# Patient Record
Sex: FEMALE | Race: WHITE | Employment: UNEMPLOYED | URBAN - METROPOLITAN AREA
[De-identification: names, ages, dates, MRNs, and addresses within clinical notes are randomized per-mention and may not be internally consistent; named-entity substitution may affect disease eponyms.]

---

## 2017-01-30 ENCOUNTER — OFFICE VISIT (OUTPATIENT)
Dept: FAMILY MEDICINE CLINIC | Age: 62
End: 2017-01-30

## 2017-01-30 VITALS
SYSTOLIC BLOOD PRESSURE: 118 MMHG | DIASTOLIC BLOOD PRESSURE: 84 MMHG | HEIGHT: 66 IN | BODY MASS INDEX: 41.69 KG/M2 | RESPIRATION RATE: 16 BRPM | TEMPERATURE: 96.3 F | HEART RATE: 80 BPM | WEIGHT: 259.4 LBS

## 2017-01-30 DIAGNOSIS — F41.9 ANXIETY: Primary | ICD-10-CM

## 2017-01-30 DIAGNOSIS — I10 ESSENTIAL HYPERTENSION: ICD-10-CM

## 2017-01-30 DIAGNOSIS — F32.A DEPRESSION, UNSPECIFIED DEPRESSION TYPE: ICD-10-CM

## 2017-01-30 DIAGNOSIS — Z12.31 VISIT FOR SCREENING MAMMOGRAM: ICD-10-CM

## 2017-01-30 DIAGNOSIS — M19.90 OSTEOARTHRITIS, UNSPECIFIED OSTEOARTHRITIS TYPE, UNSPECIFIED SITE: ICD-10-CM

## 2017-01-30 LAB
ALBUMIN SERPL-MCNC: NORMAL G/DL
ALP BLD-CCNC: NORMAL U/L
ALT SERPL-CCNC: 25 U/L
AST SERPL-CCNC: 19 U/L
BILIRUB SERPL-MCNC: NORMAL MG/DL (ref 0.1–1.4)
BUN BLDV-MCNC: 18 MG/DL
CALCIUM SERPL-MCNC: NORMAL MG/DL
CHLORIDE BLD-SCNC: 97 MMOL/L
CO2: NORMAL MMOL/L
CREAT SERPL-MCNC: 1.01 MG/DL
GFR CALCULATED: NORMAL
GLUCOSE BLD-MCNC: 99 MG/DL
POTASSIUM SERPL-SCNC: 5 MMOL/L
SODIUM BLD-SCNC: 139 MMOL/L
TOTAL PROTEIN: NORMAL

## 2017-01-30 PROCEDURE — 99212 OFFICE O/P EST SF 10 MIN: CPT | Performed by: FAMILY MEDICINE

## 2017-01-30 RX ORDER — BENAZEPRIL HYDROCHLORIDE 20 MG/1
TABLET ORAL
Qty: 90 TABLET | Refills: 3 | Status: SHIPPED | OUTPATIENT
Start: 2017-01-30 | End: 2017-10-04 | Stop reason: SDUPTHER

## 2017-01-30 RX ORDER — ESCITALOPRAM OXALATE 20 MG/1
20 TABLET ORAL DAILY
Qty: 30 TABLET | Refills: 3 | Status: SHIPPED | OUTPATIENT
Start: 2017-01-30 | End: 2017-06-15 | Stop reason: SDUPTHER

## 2017-01-30 RX ORDER — DICLOFENAC SODIUM 75 MG/1
TABLET, DELAYED RELEASE ORAL
Qty: 180 TABLET | Refills: 3 | Status: SHIPPED | OUTPATIENT
Start: 2017-01-30 | End: 2017-10-04 | Stop reason: SDUPTHER

## 2017-01-30 RX ORDER — ALPRAZOLAM 0.5 MG/1
0.5 TABLET ORAL 2 TIMES DAILY PRN
Qty: 60 TABLET | Refills: 4 | Status: SHIPPED | OUTPATIENT
Start: 2017-01-30 | End: 2017-10-04 | Stop reason: SDUPTHER

## 2017-01-30 RX ORDER — CITALOPRAM 20 MG/1
TABLET ORAL
Qty: 180 TABLET | Refills: 3 | Status: CANCELLED | OUTPATIENT
Start: 2017-01-30

## 2017-01-30 RX ORDER — TRAMADOL HYDROCHLORIDE 50 MG/1
50 TABLET ORAL EVERY 8 HOURS PRN
Qty: 90 TABLET | Refills: 2 | Status: SHIPPED | OUTPATIENT
Start: 2017-01-30 | End: 2017-10-04 | Stop reason: SDUPTHER

## 2017-01-30 ASSESSMENT — ENCOUNTER SYMPTOMS
VOMITING: 0
DIARRHEA: 0
NAUSEA: 0
ABDOMINAL PAIN: 0
SHORTNESS OF BREATH: 0
COUGH: 0
CONSTIPATION: 0
EYES NEGATIVE: 1

## 2017-02-01 DIAGNOSIS — M19.90 OSTEOARTHRITIS, UNSPECIFIED OSTEOARTHRITIS TYPE, UNSPECIFIED SITE: ICD-10-CM

## 2017-02-01 DIAGNOSIS — I10 ESSENTIAL HYPERTENSION: ICD-10-CM

## 2017-06-15 RX ORDER — ESCITALOPRAM OXALATE 20 MG/1
TABLET ORAL
Qty: 30 TABLET | Refills: 2 | Status: SHIPPED | OUTPATIENT
Start: 2017-06-15 | End: 2017-10-04 | Stop reason: SDUPTHER

## 2017-08-17 DIAGNOSIS — M19.90 OSTEOARTHRITIS, UNSPECIFIED OSTEOARTHRITIS TYPE, UNSPECIFIED SITE: ICD-10-CM

## 2017-08-18 RX ORDER — ALPRAZOLAM 0.5 MG/1
TABLET ORAL
Qty: 60 TABLET | Refills: 3 | OUTPATIENT
Start: 2017-08-18

## 2017-10-04 ENCOUNTER — OFFICE VISIT (OUTPATIENT)
Dept: FAMILY MEDICINE CLINIC | Age: 62
End: 2017-10-04

## 2017-10-04 VITALS
BODY MASS INDEX: 37.79 KG/M2 | WEIGHT: 240.8 LBS | SYSTOLIC BLOOD PRESSURE: 130 MMHG | HEART RATE: 58 BPM | HEIGHT: 67 IN | DIASTOLIC BLOOD PRESSURE: 80 MMHG | RESPIRATION RATE: 14 BRPM

## 2017-10-04 DIAGNOSIS — I10 ESSENTIAL HYPERTENSION: ICD-10-CM

## 2017-10-04 DIAGNOSIS — F32.A DEPRESSION, UNSPECIFIED DEPRESSION TYPE: ICD-10-CM

## 2017-10-04 DIAGNOSIS — M19.90 OSTEOARTHRITIS, UNSPECIFIED OSTEOARTHRITIS TYPE, UNSPECIFIED SITE: ICD-10-CM

## 2017-10-04 DIAGNOSIS — F41.9 ANXIETY: Primary | ICD-10-CM

## 2017-10-04 PROCEDURE — 99212 OFFICE O/P EST SF 10 MIN: CPT | Performed by: FAMILY MEDICINE

## 2017-10-04 RX ORDER — TRAMADOL HYDROCHLORIDE 50 MG/1
50 TABLET ORAL EVERY 8 HOURS PRN
Qty: 90 TABLET | Refills: 2 | Status: SHIPPED | OUTPATIENT
Start: 2017-10-04 | End: 2018-02-27 | Stop reason: SDUPTHER

## 2017-10-04 RX ORDER — ESCITALOPRAM OXALATE 20 MG/1
TABLET ORAL
Qty: 90 TABLET | Refills: 2 | Status: SHIPPED | OUTPATIENT
Start: 2017-10-04 | End: 2018-02-27 | Stop reason: SDUPTHER

## 2017-10-04 RX ORDER — CITALOPRAM 20 MG/1
TABLET ORAL
Qty: 180 TABLET | Refills: 2 | Status: CANCELLED | OUTPATIENT
Start: 2017-10-04

## 2017-10-04 RX ORDER — DICLOFENAC SODIUM 75 MG/1
TABLET, DELAYED RELEASE ORAL
Qty: 180 TABLET | Refills: 2 | Status: SHIPPED | OUTPATIENT
Start: 2017-10-04 | End: 2018-02-27 | Stop reason: SDUPTHER

## 2017-10-04 RX ORDER — BENAZEPRIL HYDROCHLORIDE 20 MG/1
TABLET ORAL
Qty: 90 TABLET | Refills: 2 | Status: SHIPPED | OUTPATIENT
Start: 2017-10-04 | End: 2018-02-27 | Stop reason: SDUPTHER

## 2017-10-04 RX ORDER — ALPRAZOLAM 0.5 MG/1
0.5 TABLET ORAL 2 TIMES DAILY PRN
Qty: 60 TABLET | Refills: 2 | Status: SHIPPED | OUTPATIENT
Start: 2017-10-04 | End: 2018-02-27 | Stop reason: SDUPTHER

## 2017-10-04 ASSESSMENT — ENCOUNTER SYMPTOMS
EYES NEGATIVE: 1
CONSTIPATION: 0
DIARRHEA: 0
ABDOMINAL PAIN: 0
COUGH: 0
NAUSEA: 0
SHORTNESS OF BREATH: 0

## 2017-10-04 ASSESSMENT — PATIENT HEALTH QUESTIONNAIRE - PHQ9
SUM OF ALL RESPONSES TO PHQ QUESTIONS 1-9: 0
SUM OF ALL RESPONSES TO PHQ9 QUESTIONS 1 & 2: 0
2. FEELING DOWN, DEPRESSED OR HOPELESS: 0
1. LITTLE INTEREST OR PLEASURE IN DOING THINGS: 0

## 2017-10-04 NOTE — MR AVS SNAPSHOT
After Visit Summary             Refugio Rodarte   10/4/2017 8:15 AM   Office Visit    Description:  Female : 1955   Provider:  Iraj Malin MD   Department:  [facility]: SMITH Alexander PCP              Your Follow-Up and Future Appointments         Below is a list of your follow-up and future appointments. This may not be a complete list as you may have made appointments directly with providers that we are not aware of or your providers may have made some for you. Please call your providers to confirm appointments. It is important to keep your appointments. Please bring your current insurance card, photo ID, co-pay, and all medication bottles to your appointment. If self-pay, payment is expected at the time of service. Information from Your Visit        Department     Name Address Phone Fax    [facility]: SMITH Alexander PCP [address] [place] [address] [phone]      You Were Seen for:         Comments    Anxiety   [[id number]]         Vital Signs     Blood Pressure Pulse Respirations Height Weight Last Menstrual Period    130/80 58 14 5' 7\" (1.702 m) 240 lb 12.8 oz (109.2 kg) 2007    Body Mass Index Smoking Status                37.71 kg/m2 Never Smoker          Additional Information about your Body Mass Index (BMI)           Your BMI as listed above is considered obese (30 or more). BMI is an estimate of body fat, calculated from your height and weight. The higher your BMI, the greater your risk of heart disease, high blood pressure, type 2 diabetes, stroke, gallstones, arthritis, sleep apnea, and certain cancers. BMI is not perfect. It may overestimate body fat in athletes and people who are more muscular. Even a small weight loss (between 5 and 10 percent of your current weight) by decreasing your calorie intake and becoming more physically active will help lower your risk of developing or worsening diseases associated with obesity. Learn more at: Your Style Unzipped.co.uk             Today's Medication Changes          These changes are accurate as of: 10/4/17  8:57 AM.  If you have any questions, ask your nurse or doctor. CHANGE how you take these medications           escitalopram 20 MG tablet   Commonly known as:  LEXAPRO   Instructions:  TAKE ONE TABLET BY MOUTH DAILY   Quantity:  90 tablet   Refills:  2   What changed:  See the new instructions. Changed by:  Daya Garcia MD         STOP taking these medications           citalopram 20 MG tablet   Commonly known as:  CELEXA   Stopped by:  Daya Garcia MD            Where to Get Your Medications      These medications were sent to [address] [place] [address] [phone] [facility] [phone]  [address] [place] [address], [address] [place] [address]     Phone:  [phone]     benazepril 20 MG tablet    diclofenac 75 MG EC tablet    escitalopram 20 MG tablet         You can get these medications from any pharmacy     Bring a paper prescription for each of these medications     ALPRAZolam 0.5 MG tablet    traMADol 50 MG tablet               Your Current Medications Are              escitalopram (LEXAPRO) 20 MG tablet TAKE ONE TABLET BY MOUTH DAILY    diclofenac (VOLTAREN) 75 MG EC tablet TAKE ONE TABLET BY MOUTH TWICE A DAY WITH MEALS    benazepril (LOTENSIN) 20 MG tablet TAKE ONE TABLET BY MOUTH EVERY DAY    ALPRAZolam (XANAX) 0.5 MG tablet Take 1 tablet by mouth 2 times daily as needed for Sleep or Anxiety    traMADol (ULTRAM) 50 MG tablet Take 1 tablet by mouth every 8 hours as needed for Pain    Omega-3 Fatty Acids (OMEGA 3 PO) Take by mouth    Ferrous Sulfate (IRON CR PO) Take  by mouth.         Allergies              Buspar [Buspirone Hcl] Nausea Only    Pcn [Penicillins]     Jittery         Additional Information        Basic Information     Date Of Birth Sex Race Ethnicity Preferred Language 1955 Female White Unavailable/Unknown English      Problem List as of 10/4/2017  Date Reviewed: 10/4/2017                Essential hypertension    Anxiety    Depression    Hypercholesteremia      Preventive Care        Date Due    Hepatitis C screening is recommended for all adults regardless of risk factors born between [birth year] and 1965 at least once (lifetime) who have never been tested. 1955    HIV screening is recommended for all people regardless of risk factors  aged 15-65 years at least once (lifetime) who have never been HIV tested. 3/18/1971    Colonoscopy 1/1/2016    Zoster Vaccine 3/18/2016    Tetanus Combination Vaccine (1 - Tdap) 1/30/2018 (Originally 3/18/1975)    Mammograms are recommended every 2 years for low/average risk patients aged 48 - 69, and every year for high risk patients per updated national guidelines. However these guidelines can be individualized by your provider. 1/30/2018 (Originally 3/18/2006)    Pap Smear 1/30/2018 (Originally 3/18/1977)    Yearly Flu Vaccine (1) 1/30/2018 (Originally 9/1/2017)    Cholesterol Screening 10/30/2019            [employer] [address] allows you to send messages to your doctor, view your test results, renew your prescriptions, schedule appointments, view visit notes, and more. How Do I Sign Up? 1. In your Internet browser, go to [email or web address]  2. Click on the Sign Up Now link in the Sign In box. You will see the New Member Sign Up page. 3. Enter your [employer] Access Code exactly as it appears below. You will not need to use this code after youve completed the sign-up process. If you do not sign up before the expiration date, you must request a new code. [employer] Access Code: [email or web address]  Expires: 12/3/2017  8:57 AM    4. Enter your Social Security Number ([id number]) and Date of Birth (mm/dd/yyyy) as indicated and click Submit. You will be taken to the next sign-up page. 5. Create a [employer] ID. This will be your [employer] login ID and cannot be changed, so think of one that is secure and easy to remember. 6. Create a [employer] password. You can change your password at any time. 7. Enter your Password Reset Question and Answer. This can be used at a later time if you forget your password. 8. Enter your e-mail address. You will receive e-mail notification when new information is available in [address]. 9. Click Sign Up. You can now view your medical record. Additional Information  If you have questions, please contact the physician practice where you receive care. Remember, [employer] is NOT to be used for urgent needs. For medical emergencies, dial 911. For questions regarding your [employer] account call [phone]. If you have a clinical question, please call your doctor's office.

## 2017-10-04 NOTE — PROGRESS NOTES
Relation Age of Onset    Cancer Mother      Ovarian Cancer    Diabetes Father     High Blood Pressure Father     Heart Disease Father     Mental Illness Sister      Bipolar     Allergies   Allergen Reactions    Buspar [Buspirone Hcl] Nausea Only    Pcn [Penicillins]      Jittery     Current Outpatient Prescriptions   Medication Sig Dispense Refill    escitalopram (LEXAPRO) 20 MG tablet TAKE ONE TABLET BY MOUTH DAILY 90 tablet 2    diclofenac (VOLTAREN) 75 MG EC tablet TAKE ONE TABLET BY MOUTH TWICE A DAY WITH MEALS 180 tablet 2    benazepril (LOTENSIN) 20 MG tablet TAKE ONE TABLET BY MOUTH EVERY DAY 90 tablet 2    ALPRAZolam (XANAX) 0.5 MG tablet Take 1 tablet by mouth 2 times daily as needed for Sleep or Anxiety 60 tablet 2    traMADol (ULTRAM) 50 MG tablet Take 1 tablet by mouth every 8 hours as needed for Pain 90 tablet 2    Omega-3 Fatty Acids (OMEGA 3 PO) Take by mouth      Ferrous Sulfate (IRON CR PO) Take  by mouth. No current facility-administered medications for this visit. PMH, Surgical Hx, Family Hx, and Social Hx reviewed and updated. Health Maintenance reviewed. Objective    Vitals:    10/04/17 0822   BP: 130/80   Pulse: 58   Resp: 14   Weight: 240 lb 12.8 oz (109.2 kg)   Height: 5' 7\" (1.702 m)       Physical Exam   Constitutional: She appears well-nourished. No distress. HENT:   Nose: Nose normal.   Mouth/Throat: Oropharynx is clear and moist.   Eyes: Conjunctivae are normal. Pupils are equal, round, and reactive to light. Neck: Neck supple. Carotid bruit is not present. No thyromegaly present. Cardiovascular: Normal rate, regular rhythm, normal heart sounds and normal pulses. No murmur heard. Pulmonary/Chest: Effort normal and breath sounds normal. She has no wheezes. Abdominal: Soft. Bowel sounds are normal. She exhibits no mass. There is no hepatomegaly. There is no tenderness. Musculoskeletal: She exhibits no edema.    OA changes knees   Lymphadenopathy: She has no cervical adenopathy. Neurological: She is alert. Skin: Skin is warm. No rash noted. No cyanosis. Psychiatric: Her speech is normal and behavior is normal.   Mild depressed mood     Assessment & Plan   1. Anxiety control    2. Essential hypertension stable benazepril (LOTENSIN) 20 MG tablet   3. Osteoarthritis, unspecified osteoarthritis type, unspecified site  ALPRAZolam (XANAX) 0.5 MG tablet    traMADol (ULTRAM) 50 MG tablet   4. Depression, unspecified depression type       No orders of the defined types were placed in this encounter. Orders Placed This Encounter   Medications    escitalopram (LEXAPRO) 20 MG tablet     Sig: TAKE ONE TABLET BY MOUTH DAILY     Dispense:  90 tablet     Refill:  2    diclofenac (VOLTAREN) 75 MG EC tablet     Sig: TAKE ONE TABLET BY MOUTH TWICE A DAY WITH MEALS     Dispense:  180 tablet     Refill:  2    benazepril (LOTENSIN) 20 MG tablet     Sig: TAKE ONE TABLET BY MOUTH EVERY DAY     Dispense:  90 tablet     Refill:  2    ALPRAZolam (XANAX) 0.5 MG tablet     Sig: Take 1 tablet by mouth 2 times daily as needed for Sleep or Anxiety     Dispense:  60 tablet     Refill:  2    traMADol (ULTRAM) 50 MG tablet     Sig: Take 1 tablet by mouth every 8 hours as needed for Pain     Dispense:  90 tablet     Refill:  2     Medications Discontinued During This Encounter   Medication Reason    citalopram (CELEXA) 20 MG tablet Alternate therapy    escitalopram (LEXAPRO) 20 MG tablet Reorder    diclofenac (VOLTAREN) 75 MG EC tablet Reorder    benazepril (LOTENSIN) 20 MG tablet Reorder    ALPRAZolam (XANAX) 0.5 MG tablet Reorder    traMADol (ULTRAM) 50 MG tablet Reorder     No Follow-up on file. Long talk. Avoid activities that exacerbate symptoms  The importance of a good diet and exercise regimen discussed. Take medications as prescribed. Follow up as instructed.   Call if any problems        Controlled Substances Monitoring:          Paz Hodgkin,

## 2017-11-20 ENCOUNTER — OFFICE VISIT (OUTPATIENT)
Dept: FAMILY MEDICINE CLINIC | Age: 62
End: 2017-11-20

## 2017-11-20 VITALS
WEIGHT: 241.8 LBS | DIASTOLIC BLOOD PRESSURE: 86 MMHG | HEIGHT: 67 IN | HEART RATE: 84 BPM | TEMPERATURE: 96.8 F | RESPIRATION RATE: 16 BRPM | BODY MASS INDEX: 37.95 KG/M2 | SYSTOLIC BLOOD PRESSURE: 138 MMHG

## 2017-11-20 DIAGNOSIS — F41.9 ANXIETY: ICD-10-CM

## 2017-11-20 DIAGNOSIS — J06.9 ACUTE UPPER RESPIRATORY INFECTION: ICD-10-CM

## 2017-11-20 DIAGNOSIS — D72.829 LEUKOCYTOSIS, UNSPECIFIED TYPE: Primary | ICD-10-CM

## 2017-11-20 DIAGNOSIS — Z12.11 COLON CANCER SCREENING: ICD-10-CM

## 2017-11-20 DIAGNOSIS — F32.A DEPRESSION, UNSPECIFIED DEPRESSION TYPE: ICD-10-CM

## 2017-11-20 DIAGNOSIS — E78.00 HYPERCHOLESTEREMIA: ICD-10-CM

## 2017-11-20 DIAGNOSIS — I10 ESSENTIAL HYPERTENSION: ICD-10-CM

## 2017-11-20 DIAGNOSIS — Z12.11 SPECIAL SCREENING FOR MALIGNANT NEOPLASM OF COLON: ICD-10-CM

## 2017-11-20 PROCEDURE — 99212 OFFICE O/P EST SF 10 MIN: CPT | Performed by: FAMILY MEDICINE

## 2017-11-20 RX ORDER — AZITHROMYCIN 250 MG/1
TABLET, FILM COATED ORAL
Qty: 6 TABLET | Refills: 0 | Status: SHIPPED | OUTPATIENT
Start: 2017-11-20 | End: 2018-02-27 | Stop reason: ALTCHOICE

## 2017-11-20 ASSESSMENT — ENCOUNTER SYMPTOMS
ABDOMINAL PAIN: 0
DIARRHEA: 0
CONSTIPATION: 0
SHORTNESS OF BREATH: 0
NAUSEA: 0
EYES NEGATIVE: 1
RHINORRHEA: 1
COUGH: 0

## 2017-11-20 NOTE — LETTER
· I agree to participate in any medical, psychological or psychiatric assessments recommended by my provider. · I will actively participate in any program designed to improve function, including social, physical, psychological and daily or work activities. 2. I will not use illegal or street drugs or another person's prescription. If I have an addiction problem with drugs or alcohol and my provider asks me to enter a program to address this issue, I agree to follow through. Such programs may include:  · 12-Step program and securing a sponsor  · Individual counseling   · Inpatient or outpatient treatment  · Other:_____________________________________________________________________________________________________________________________________________    If in treatment, I will request that a copy of the programs initial evaluation and treatment recommendations be sent to this provider and will not expect refills until that is received. I will also request written monthly updates be sent to this provider to verify my continuing treatment. 3. I will consent to drug screening upon my providers request to assure I am only taking the prescribed drugs, described in this MEDICATION AGREEMENT. I understand that a drug screen is a laboratory test in which a sample of my urine, blood or saliva is checked to see what drugs I have been taking. 4. I agree that I will treat the providers and staff at this office with respect at all times. I will keep all of my scheduled appointments, but if I need to cancel my appointment, I will do so a minimum of 24 hours before it is scheduled. 5. I understand that this provider may stop prescribing the medications listed if:  · I do not show any improvement in pain, or my activity has not improved. · I develop rapid tolerance or loss of improvement, as described in my treatment plan. · I develop significant side effects from the medication. · My behavior is inconsistent with the responsibilities outlined above, which may also result in my being prevented from receiving further care from this office. · Other:____________________________________________________________________    AGREEMENT:    I have read the above and have had all of my questions answered. For chronic disease management, I know that my symptoms can be managed with many types of treatments. A chronic medication trial may be part of my treatment, but I must be an active participant in my care. Medication therapy is only one part of my symptom management plan. In some cases, there may be limited scientific evidence to support the chronic use of certain medications to improve symptoms and daily function. Furthermore, in certain circumstances, there may be scientific information that suggests that use of chronic controlled substances may actually worsen my symptoms and increase my risk of unintentional death directly related to this medication therapy. I know that if my provider feels my risk from controlled medications is greater than my benefit, I will have my controlled substance medication(s) compassionately lowered or removed altogether. I agree to a controlled substance medication trial.      I further agree to allow this office to contact family or friends if there are concerns about my safety and use of the controlled medications. I have agreed to use the following medications above as instructed by my physician and as stated in this [facility] [address].      Patient Signature:  ______________________  Date:11/17/2017 or _____________    Provider Signature:______________________  Date:11/17/2017 or _____________

## 2018-02-27 ENCOUNTER — OFFICE VISIT (OUTPATIENT)
Dept: FAMILY MEDICINE CLINIC | Age: 63
End: 2018-02-27

## 2018-02-27 VITALS
WEIGHT: 247.6 LBS | HEIGHT: 67 IN | DIASTOLIC BLOOD PRESSURE: 80 MMHG | RESPIRATION RATE: 20 BRPM | BODY MASS INDEX: 38.86 KG/M2 | HEART RATE: 84 BPM | SYSTOLIC BLOOD PRESSURE: 136 MMHG | TEMPERATURE: 97.5 F

## 2018-02-27 DIAGNOSIS — M19.90 OSTEOARTHRITIS, UNSPECIFIED OSTEOARTHRITIS TYPE, UNSPECIFIED SITE: ICD-10-CM

## 2018-02-27 DIAGNOSIS — J06.9 ACUTE UPPER RESPIRATORY INFECTION: ICD-10-CM

## 2018-02-27 DIAGNOSIS — E78.00 HYPERCHOLESTEREMIA: ICD-10-CM

## 2018-02-27 DIAGNOSIS — J20.9 ACUTE BRONCHITIS, UNSPECIFIED ORGANISM: Primary | ICD-10-CM

## 2018-02-27 DIAGNOSIS — I10 ESSENTIAL HYPERTENSION: ICD-10-CM

## 2018-02-27 PROCEDURE — 99212 OFFICE O/P EST SF 10 MIN: CPT | Performed by: FAMILY MEDICINE

## 2018-02-27 RX ORDER — PREDNISONE 10 MG/1
TABLET ORAL
Qty: 12 TABLET | Refills: 0 | Status: SHIPPED | OUTPATIENT
Start: 2018-02-27 | End: 2018-03-09

## 2018-02-27 RX ORDER — AZITHROMYCIN 250 MG/1
TABLET, FILM COATED ORAL
Qty: 6 TABLET | Refills: 0 | Status: SHIPPED | OUTPATIENT
Start: 2018-02-27 | End: 2018-11-14 | Stop reason: ALTCHOICE

## 2018-02-27 RX ORDER — BENAZEPRIL HYDROCHLORIDE 20 MG/1
TABLET ORAL
Qty: 90 TABLET | Refills: 2 | Status: SHIPPED | OUTPATIENT
Start: 2018-02-27 | End: 2018-11-14 | Stop reason: SDUPTHER

## 2018-02-27 RX ORDER — DICLOFENAC SODIUM 75 MG/1
TABLET, DELAYED RELEASE ORAL
Qty: 180 TABLET | Refills: 2 | Status: SHIPPED | OUTPATIENT
Start: 2018-02-27 | End: 2018-11-14 | Stop reason: SDUPTHER

## 2018-02-27 RX ORDER — TRAMADOL HYDROCHLORIDE 50 MG/1
50 TABLET ORAL EVERY 8 HOURS PRN
Qty: 90 TABLET | Refills: 1 | Status: SHIPPED | OUTPATIENT
Start: 2018-02-27 | End: 2018-03-29

## 2018-02-27 RX ORDER — ALPRAZOLAM 0.5 MG/1
0.5 TABLET ORAL 2 TIMES DAILY PRN
Qty: 60 TABLET | Refills: 3 | Status: SHIPPED | OUTPATIENT
Start: 2018-02-27 | End: 2018-11-14 | Stop reason: SDUPTHER

## 2018-02-27 RX ORDER — ESCITALOPRAM OXALATE 20 MG/1
TABLET ORAL
Qty: 90 TABLET | Refills: 2 | Status: SHIPPED | OUTPATIENT
Start: 2018-02-27 | End: 2018-11-14 | Stop reason: SDUPTHER

## 2018-02-27 ASSESSMENT — ENCOUNTER SYMPTOMS
SORE THROAT: 0
EYE ITCHING: 0
CONSTIPATION: 0
SINUS PRESSURE: 0
DIARRHEA: 0
ABDOMINAL PAIN: 0
EYE DISCHARGE: 0

## 2018-02-27 NOTE — PROGRESS NOTES
tablet     Refill:  3    traMADol (ULTRAM) 50 MG tablet     Sig: Take 1 tablet by mouth every 8 hours as needed for Pain for up to 30 days. Dispense:  90 tablet     Refill:  1    azithromycin (ZITHROMAX Z-DIALLO) 250 MG tablet     Si STAT THEN 1 DAILY     Dispense:  6 tablet     Refill:  0    predniSONE (DELTASONE) 10 MG tablet     Sig: Tid for 3 days then 1 daily     Dispense:  12 tablet     Refill:  0     Medications Discontinued During This Encounter   Medication Reason    azithromycin (ZITHROMAX Z-DIALLO) 250 MG tablet Therapy completed    escitalopram (LEXAPRO) 20 MG tablet Reorder    diclofenac (VOLTAREN) 75 MG EC tablet Reorder    benazepril (LOTENSIN) 20 MG tablet Reorder    ALPRAZolam (XANAX) 0.5 MG tablet Reorder    traMADol (ULTRAM) 50 MG tablet Reorder     No Follow-up on file. Long talk. Health maintenance issues reviewed and discussed with recommendations made. Declines at this time due to cost    The importance of a good diet and exercise regimen discussed. Take medications as prescribed. Follow up as instructed. Call if any problems        Controlled Substances Monitoring:              Rich Becerril MD          Please note this report has been partially produced using speech recognition software  And may cause contain errors related to that system including grammar, punctuation and spelling as well as words and phrases that may seem inappropriate. If there are questions or concerns please feel free to contact me to clarify.

## 2018-11-14 ENCOUNTER — OFFICE VISIT (OUTPATIENT)
Dept: FAMILY MEDICINE CLINIC | Age: 63
End: 2018-11-14

## 2018-11-14 VITALS
WEIGHT: 270.8 LBS | TEMPERATURE: 96.4 F | RESPIRATION RATE: 16 BRPM | DIASTOLIC BLOOD PRESSURE: 86 MMHG | BODY MASS INDEX: 42.5 KG/M2 | SYSTOLIC BLOOD PRESSURE: 126 MMHG | HEIGHT: 67 IN | HEART RATE: 80 BPM

## 2018-11-14 DIAGNOSIS — F32.A DEPRESSION, UNSPECIFIED DEPRESSION TYPE: ICD-10-CM

## 2018-11-14 DIAGNOSIS — F41.9 ANXIETY: Primary | ICD-10-CM

## 2018-11-14 DIAGNOSIS — E78.00 HYPERCHOLESTEREMIA: ICD-10-CM

## 2018-11-14 DIAGNOSIS — M77.9 TENDONITIS: ICD-10-CM

## 2018-11-14 DIAGNOSIS — I10 ESSENTIAL HYPERTENSION: ICD-10-CM

## 2018-11-14 DIAGNOSIS — R07.89 ATYPICAL CHEST PAIN: ICD-10-CM

## 2018-11-14 DIAGNOSIS — M25.511 ACUTE PAIN OF RIGHT SHOULDER: ICD-10-CM

## 2018-11-14 PROCEDURE — 99213 OFFICE O/P EST LOW 20 MIN: CPT | Performed by: FAMILY MEDICINE

## 2018-11-14 RX ORDER — ESCITALOPRAM OXALATE 20 MG/1
TABLET ORAL
Qty: 90 TABLET | Refills: 2 | Status: SHIPPED | OUTPATIENT
Start: 2018-11-14

## 2018-11-14 RX ORDER — PREDNISONE 10 MG/1
TABLET ORAL
Qty: 18 TABLET | Refills: 0 | Status: SHIPPED | OUTPATIENT
Start: 2018-11-14 | End: 2018-11-24

## 2018-11-14 RX ORDER — DICLOFENAC SODIUM 75 MG/1
TABLET, DELAYED RELEASE ORAL
Qty: 180 TABLET | Refills: 2 | Status: SHIPPED | OUTPATIENT
Start: 2018-11-14

## 2018-11-14 RX ORDER — BENAZEPRIL HYDROCHLORIDE 20 MG/1
TABLET ORAL
Qty: 90 TABLET | Refills: 2 | Status: SHIPPED | OUTPATIENT
Start: 2018-11-14

## 2018-11-14 RX ORDER — ALPRAZOLAM 0.5 MG/1
0.5 TABLET ORAL 2 TIMES DAILY PRN
Qty: 60 TABLET | Refills: 3 | Status: SHIPPED | OUTPATIENT
Start: 2018-11-14 | End: 2018-12-14

## 2018-11-14 ASSESSMENT — ENCOUNTER SYMPTOMS
NAUSEA: 0
COUGH: 0
DIARRHEA: 0
EYES NEGATIVE: 1
CONSTIPATION: 0
ABDOMINAL PAIN: 0
SHORTNESS OF BREATH: 0

## 2018-11-14 ASSESSMENT — PATIENT HEALTH QUESTIONNAIRE - PHQ9
2. FEELING DOWN, DEPRESSED OR HOPELESS: 1
SUM OF ALL RESPONSES TO PHQ QUESTIONS 1-9: 2
SUM OF ALL RESPONSES TO PHQ9 QUESTIONS 1 & 2: 2
SUM OF ALL RESPONSES TO PHQ QUESTIONS 1-9: 2
1. LITTLE INTEREST OR PLEASURE IN DOING THINGS: 1

## 2018-11-30 ENCOUNTER — TELEPHONE (OUTPATIENT)
Dept: FAMILY MEDICINE CLINIC | Age: 63
End: 2018-11-30

## 2018-11-30 NOTE — TELEPHONE ENCOUNTER
Physical therapy is a great option for treatment of tendonitis along with NSAIDs, such as, Aleve, Motrin, etc. Sometimes steroids help. If she is agreeable to therapy, please go ahead and order this for me to sign off. Thanks.

## 2019-06-20 ENCOUNTER — TELEPHONE (OUTPATIENT)
Dept: ADMINISTRATIVE | Age: 64
End: 2019-06-20

## 2023-03-20 DIAGNOSIS — I10 HYPERTENSION, UNSPECIFIED TYPE: ICD-10-CM

## 2023-03-20 DIAGNOSIS — M19.90 ARTHRITIS: ICD-10-CM

## 2023-03-20 RX ORDER — BENAZEPRIL HYDROCHLORIDE 20 MG/1
20 TABLET ORAL DAILY
Qty: 90 TABLET | Refills: 0 | Status: SHIPPED | OUTPATIENT
Start: 2023-03-20 | End: 2023-05-15 | Stop reason: SDUPTHER

## 2023-03-20 RX ORDER — BENAZEPRIL HYDROCHLORIDE 20 MG/1
20 TABLET ORAL DAILY
COMMUNITY
End: 2023-03-20 | Stop reason: SDUPTHER

## 2023-03-20 RX ORDER — DICLOFENAC SODIUM 75 MG/1
75 TABLET, DELAYED RELEASE ORAL DAILY
Qty: 90 TABLET | Refills: 0 | Status: SHIPPED | OUTPATIENT
Start: 2023-03-20 | End: 2023-05-15 | Stop reason: SDUPTHER

## 2023-03-20 RX ORDER — DICLOFENAC SODIUM 75 MG/1
75 TABLET, DELAYED RELEASE ORAL DAILY
COMMUNITY
End: 2023-03-20 | Stop reason: SDUPTHER

## 2023-03-20 NOTE — TELEPHONE ENCOUNTER
Rx Refill Request Telephone Encounter    Name:  Blanca Dempsey  : 1955     Medication Name:  Diclofenac 75 MG  Quantity (Optional):    90  Directions (Optional):   Take 1 tab by mouth daily      Medication Name:  Benazepril 20 MG  Quantity (Optional):    90  Directions (Optional):   Take 1 tablet by mouth daily    Specific Pharmacy location:  [facility]    Date of last appointment:  1/3/23

## 2023-05-12 PROBLEM — I10 ESSENTIAL HYPERTENSION: Status: ACTIVE | Noted: 2023-05-12

## 2023-05-12 PROBLEM — M19.90 ARTHRITIS: Status: ACTIVE | Noted: 2023-05-12

## 2023-05-12 PROBLEM — R53.83 FATIGUE: Status: ACTIVE | Noted: 2023-05-12

## 2023-05-12 PROBLEM — G43.001 MIGRAINE WITHOUT AURA AND WITH STATUS MIGRAINOSUS, NOT INTRACTABLE: Status: ACTIVE | Noted: 2023-05-12

## 2023-05-12 PROBLEM — F41.9 ANXIETY: Status: ACTIVE | Noted: 2023-05-12

## 2023-05-12 PROBLEM — R42 DIZZINESS: Status: ACTIVE | Noted: 2023-05-12

## 2023-05-12 PROBLEM — F32.A DEPRESSION: Status: ACTIVE | Noted: 2023-05-12

## 2023-05-12 PROBLEM — E11.9 TYPE 2 DIABETES MELLITUS WITHOUT COMPLICATION, WITHOUT LONG-TERM CURRENT USE OF INSULIN (MULTI): Status: ACTIVE | Noted: 2023-05-12

## 2023-05-12 PROBLEM — R00.0 TACHYCARDIA: Status: ACTIVE | Noted: 2023-05-12

## 2023-05-12 PROBLEM — E78.00 HYPERCHOLESTEROLEMIA: Status: ACTIVE | Noted: 2023-05-12

## 2023-05-12 PROBLEM — R51.9 FREQUENT HEADACHES: Status: ACTIVE | Noted: 2023-05-12

## 2023-05-12 RX ORDER — METOPROLOL SUCCINATE 50 MG/1
50 TABLET, EXTENDED RELEASE ORAL DAILY
COMMUNITY
End: 2023-05-15 | Stop reason: SDUPTHER

## 2023-05-12 RX ORDER — TOPIRAMATE 50 MG/1
100 TABLET, FILM COATED ORAL NIGHTLY
COMMUNITY
End: 2023-05-15 | Stop reason: SDUPTHER

## 2023-05-12 RX ORDER — ALPRAZOLAM 0.5 MG/1
0.5 TABLET ORAL 2 TIMES DAILY
COMMUNITY
End: 2023-05-15 | Stop reason: SDUPTHER

## 2023-05-12 RX ORDER — VENLAFAXINE HYDROCHLORIDE 37.5 MG/1
37.5 CAPSULE, EXTENDED RELEASE ORAL 3 TIMES DAILY
COMMUNITY
End: 2023-05-15 | Stop reason: SDUPTHER

## 2023-05-12 RX ORDER — METOPROLOL SUCCINATE 100 MG/1
1 TABLET, EXTENDED RELEASE ORAL DAILY
COMMUNITY
Start: 2023-01-03 | End: 2024-01-22 | Stop reason: SDUPTHER

## 2023-05-15 ENCOUNTER — OFFICE VISIT (OUTPATIENT)
Dept: PRIMARY CARE | Facility: CLINIC | Age: 68
End: 2023-05-15
Payer: MEDICARE

## 2023-05-15 VITALS
BODY MASS INDEX: 43.95 KG/M2 | SYSTOLIC BLOOD PRESSURE: 130 MMHG | DIASTOLIC BLOOD PRESSURE: 84 MMHG | OXYGEN SATURATION: 95 % | TEMPERATURE: 97.2 F | HEIGHT: 67 IN | HEART RATE: 86 BPM | WEIGHT: 280 LBS

## 2023-05-15 DIAGNOSIS — Z12.31 ENCOUNTER FOR SCREENING MAMMOGRAM FOR MALIGNANT NEOPLASM OF BREAST: ICD-10-CM

## 2023-05-15 DIAGNOSIS — Z78.0 POSTMENOPAUSE: ICD-10-CM

## 2023-05-15 DIAGNOSIS — F41.9 ANXIETY: ICD-10-CM

## 2023-05-15 DIAGNOSIS — R51.9 FREQUENT HEADACHES: ICD-10-CM

## 2023-05-15 DIAGNOSIS — I10 HYPERTENSION, UNSPECIFIED TYPE: ICD-10-CM

## 2023-05-15 DIAGNOSIS — E11.9 TYPE 2 DIABETES MELLITUS WITHOUT COMPLICATION, WITHOUT LONG-TERM CURRENT USE OF INSULIN (MULTI): Primary | ICD-10-CM

## 2023-05-15 DIAGNOSIS — E78.00 HYPERCHOLESTEROLEMIA: ICD-10-CM

## 2023-05-15 DIAGNOSIS — I10 ESSENTIAL HYPERTENSION: ICD-10-CM

## 2023-05-15 DIAGNOSIS — M19.90 ARTHRITIS: ICD-10-CM

## 2023-05-15 DIAGNOSIS — Z12.11 COLON CANCER SCREENING: ICD-10-CM

## 2023-05-15 DIAGNOSIS — E66.01 OBESITY, MORBID (MULTI): ICD-10-CM

## 2023-05-15 PROCEDURE — 99213 OFFICE O/P EST LOW 20 MIN: CPT | Performed by: FAMILY MEDICINE

## 2023-05-15 PROCEDURE — 3075F SYST BP GE 130 - 139MM HG: CPT | Performed by: FAMILY MEDICINE

## 2023-05-15 PROCEDURE — 1159F MED LIST DOCD IN RCRD: CPT | Performed by: FAMILY MEDICINE

## 2023-05-15 PROCEDURE — 1036F TOBACCO NON-USER: CPT | Performed by: FAMILY MEDICINE

## 2023-05-15 PROCEDURE — 4010F ACE/ARB THERAPY RXD/TAKEN: CPT | Performed by: FAMILY MEDICINE

## 2023-05-15 PROCEDURE — 1160F RVW MEDS BY RX/DR IN RCRD: CPT | Performed by: FAMILY MEDICINE

## 2023-05-15 PROCEDURE — 3079F DIAST BP 80-89 MM HG: CPT | Performed by: FAMILY MEDICINE

## 2023-05-15 RX ORDER — BENAZEPRIL HYDROCHLORIDE 20 MG/1
20 TABLET ORAL DAILY
Qty: 90 TABLET | Refills: 0 | Status: SHIPPED | OUTPATIENT
Start: 2023-05-15 | End: 2023-09-22 | Stop reason: SDUPTHER

## 2023-05-15 RX ORDER — ALPRAZOLAM 0.5 MG/1
0.5 TABLET ORAL 2 TIMES DAILY
Qty: 60 TABLET | Refills: 1 | Status: SHIPPED | OUTPATIENT
Start: 2023-05-15 | End: 2024-01-22 | Stop reason: SDUPTHER

## 2023-05-15 RX ORDER — DICLOFENAC SODIUM 75 MG/1
75 TABLET, DELAYED RELEASE ORAL DAILY
Qty: 90 TABLET | Refills: 1 | Status: SHIPPED | OUTPATIENT
Start: 2023-05-15 | End: 2023-12-26 | Stop reason: SDUPTHER

## 2023-05-15 RX ORDER — TOPIRAMATE 50 MG/1
150 TABLET, FILM COATED ORAL NIGHTLY
Qty: 270 TABLET | Refills: 1 | Status: SHIPPED | OUTPATIENT
Start: 2023-05-15 | End: 2024-01-22 | Stop reason: SDUPTHER

## 2023-05-15 RX ORDER — METOPROLOL SUCCINATE 50 MG/1
50 TABLET, EXTENDED RELEASE ORAL DAILY
Qty: 90 TABLET | Refills: 1 | Status: SHIPPED | OUTPATIENT
Start: 2023-05-15 | End: 2023-11-13

## 2023-05-15 RX ORDER — VENLAFAXINE HYDROCHLORIDE 37.5 MG/1
37.5 CAPSULE, EXTENDED RELEASE ORAL 3 TIMES DAILY
Qty: 270 CAPSULE | Refills: 1 | Status: SHIPPED | OUTPATIENT
Start: 2023-05-15 | End: 2023-11-14

## 2023-05-15 ASSESSMENT — PATIENT HEALTH QUESTIONNAIRE - PHQ9
1. LITTLE INTEREST OR PLEASURE IN DOING THINGS: NOT AT ALL
SUM OF ALL RESPONSES TO PHQ9 QUESTIONS 1 AND 2: 0
2. FEELING DOWN, DEPRESSED OR HOPELESS: NOT AT ALL

## 2023-05-15 NOTE — PROGRESS NOTES
"Subjective   Patient ID: Blanca Dempsey is a 68 y.o. female who presents for Hypertension, Headache, and Anxiety.    HPI  Anxiety follow up  Taking the alprazolam   Working well   80% effective   Denies any side effects   [place] reviewed today   CSA 01/03/2023  Last took x 2 nights ago    Headaches   Taking Topamax  Patient is getting headaches daily   Located in the front of her head   Described as a \"building pain\"  Does radiate to the back of the head   Has also taken Excedrin with mild relief     HTN follow up   Denies SOB  Admits to chest pressure (not new) and dizziness   Denies any swelling, lightheadedness   Eats a generally unhealthy diet  Does not exercise   Does check BP at home  Currently taking Benazepril, Metoprolol    .ROS  Constitutional- No activity change. No appetite change.  Eyes- Denies vision changes.  Respiratory- No shortness of breath.  Cardiovascular- No palpitations. No chest pain. Positive chest pressure   GI- No nausea or vomiting. No diarrhea or constipation. Denies abdominal pain.  Musculoskeletal- Denies joint swelling.  Extremities- No edema.  Neurological- Positive headaches, dizziness   Skin- No rashes.  Psychiatric/Behavioral- Denies significant anxiety, or depressed mood.     Objective     /84   Pulse 86   Temp 36.2 °C (97.2 °F) (Temporal)   Ht 1.702 m (5' 7\")   Wt 127 kg (280 lb)   SpO2 95%   BMI 43.85 kg/m²     Allergies   Allergen Reactions    Oxycodone Itching     After surgery 11/2017 allergy discovered. Itching relieved after medication stopped    Buspirone Hcl Nausea Only    Penicillins Unknown     Jittery       Constitutional-- Well-nourished.  No distress  Head- unremarkable.  Ears- TMs clear.  Eyes- PERRL.  Conjunctiva normal.  Nose- Normal.  No rhinorrhea noted.  Throat- Oropharynx is clear and moist.  Neck- Supple with no thyromegaly.  No significant cervical adenopathy noted.  Pulmonary/Chest- Breath sounds normal with normal effort.  No " wheezing.  Heart- Regular rate and rhythm.  No murmur.  Abdomen- Soft and non-tender.  No masses noted.  Musculoskeletal- Normal ROM.  No significant joint swelling  Extremities- No edema.   Neurological- Alert.  No noted deficits.  Skin- Warm.  No rashes.  Psychiatric/Behavioral- Mood and affect normal.  Behavior normal.     Assessment/Plan   1. Type 2 diabetes mellitus without complication, without long-term current use of insulin (CMS/[facility])  Hemoglobin A1C      2. Colon cancer screening  Colonoscopy      3. Encounter for screening mammogram for malignant neoplasm of breast  BI mammo bilateral screening tomosynthesis      4. Postmenopause  XR DEXA bone density      5. Essential hypertension  CBC and Auto Differential    Comprehensive Metabolic Panel      6. Hypercholesterolemia  Lipid Panel      7. Anxiety  ALPRAZolam (Xanax) 0.5 mg tablet    venlafaxine XR (Effexor-XR) 37.5 mg 24 hr capsule      8. Hypertension, unspecified type  benazepril (Lotensin) 20 mg tablet    metoprolol succinate XL (Toprol-XL) 50 mg 24 hr tablet      9. Arthritis  diclofenac (Voltaren) 75 mg EC tablet      10. Frequent headaches  topiramate (Topamax) 50 mg tablet      11. Obesity, morbid (CMS/HCC)           Long talk. Treatment options reviewed.     Health maintenance: Fasting blood work ordered to be done at any  lab at pt's convenience.     Colon cancer screening: Colonoscopy ordered.     Postmenopause: DEXA scan and mammogram ordered.     Frequent headaches: Increased Topamax; take three 50 mg tablets at bedtime.     HTN: Stable, well controlled at this time.     Anxiety: Continue Xanax 0.5 mg BID.     Continue and take your medications as prescribed.   Requested Prescriptions     Signed Prescriptions Disp Refills    ALPRAZolam (Xanax) 0.5 mg tablet 60 tablet 1     Sig: Take 1 tablet (0.5 mg) by mouth 2 times a day.    benazepril (Lotensin) 20 mg tablet 90 tablet 0     Sig: Take 1 tablet (20 mg) by mouth once daily.    diclofenac  (Voltaren) 75 mg EC tablet 90 tablet 1     Sig: Take 1 tablet (75 mg) by mouth once daily.    metoprolol succinate XL (Toprol-XL) 50 mg 24 hr tablet 90 tablet 1     Sig: Take 1 tablet (50 mg) by mouth once daily.    topiramate (Topamax) 50 mg tablet 270 tablet 1     Sig: Take 3 tablets (150 mg) by mouth once daily at bedtime.    venlafaxine XR (Effexor-XR) 37.5 mg 24 hr capsule 270 capsule 1     Sig: Take 1 capsule (37.5 mg) by mouth 3 times a day.      Health Maintenance issues dicussed.    Importance of healthy diet and regular exercise regimen discussed.    We will contact you with any test results ordered. If you do not hear from us, please contact.    Follow-up as instructed or sooner if any problems or symptoms do not resolve as expected.     Scribe Attestation  By signing my name below, IJose, Scribe   attest that this documentation has been prepared under the direction and in the presence of Zaid Lux MD.

## 2023-05-17 ENCOUNTER — TELEPHONE (OUTPATIENT)
Dept: PRIMARY CARE | Facility: CLINIC | Age: 68
End: 2023-05-17
Payer: MEDICARE

## 2023-05-17 NOTE — TELEPHONE ENCOUNTER
Pt calling and asking for a letter to relieve her from Jury Duty. She states that her anxiety prevents her from going. OK for letter?    Please advise    Last office visit: 5/15/23  Fax form # [phone]

## 2023-05-17 NOTE — LETTER
May 17, 2023    Blanca Dempsey  [address] [address]    To Whom it May Concern:    Due to Blanca's current  medical condition, she is unable to participate in jury duty at this time.    If you have any questions or concerns, please don't hesitate to call.    Sincerely,           Zaid Lxu MD

## 2023-05-20 PROBLEM — E66.01 OBESITY, MORBID (MULTI): Status: ACTIVE | Noted: 2023-05-20

## 2023-09-22 DIAGNOSIS — I10 HYPERTENSION, UNSPECIFIED TYPE: ICD-10-CM

## 2023-09-22 RX ORDER — BENAZEPRIL HYDROCHLORIDE 20 MG/1
20 TABLET ORAL DAILY
Qty: 90 TABLET | Refills: 0 | Status: SHIPPED | OUTPATIENT
Start: 2023-09-22 | End: 2024-01-22 | Stop reason: SDUPTHER

## 2023-09-22 NOTE — TELEPHONE ENCOUNTER
Rx Refill Request Telephone Encounter    Name:  Blanca Dempsey  : 1955     Medication Name:  BENAZEPRIL  Dose (Optional):    20 MG  Quantity (Optional):    #90  Directions (Optional):   1 TABLET DAILY    ALLERGIES:   ON FILE    Specific Pharmacy location:  [facility]    Date of last appointment:  05/15/23  Date of next appointment:  NONE    Best number to reach patient:  [phone]

## 2023-11-11 DIAGNOSIS — I10 HYPERTENSION, UNSPECIFIED TYPE: ICD-10-CM

## 2023-11-13 RX ORDER — METOPROLOL SUCCINATE 50 MG/1
50 TABLET, EXTENDED RELEASE ORAL DAILY
Qty: 90 TABLET | Refills: 0 | Status: SHIPPED | OUTPATIENT
Start: 2023-11-13 | End: 2024-01-22 | Stop reason: SDUPTHER

## 2023-11-13 NOTE — TELEPHONE ENCOUNTER
Last seen in May, please call and schedule appointment.  Can send in short supply if needed, just let us know.  Thanks ladies!

## 2023-11-13 NOTE — TELEPHONE ENCOUNTER
Lmom for patient to set up appointment for refills and request short supply if needed  [phone]    Please refuse med

## 2023-11-14 DIAGNOSIS — F41.9 ANXIETY: ICD-10-CM

## 2023-11-14 RX ORDER — VENLAFAXINE HYDROCHLORIDE 37.5 MG/1
37.5 CAPSULE, EXTENDED RELEASE ORAL 3 TIMES DAILY
Qty: 90 CAPSULE | Refills: 0 | Status: SHIPPED | OUTPATIENT
Start: 2023-11-14 | End: 2024-01-22 | Stop reason: SDUPTHER

## 2023-12-26 DIAGNOSIS — M19.90 ARTHRITIS: ICD-10-CM

## 2023-12-26 RX ORDER — DICLOFENAC SODIUM 75 MG/1
75 TABLET, DELAYED RELEASE ORAL DAILY
Qty: 30 TABLET | Refills: 0 | Status: SHIPPED | OUTPATIENT
Start: 2023-12-26 | End: 2024-01-22 | Stop reason: SDUPTHER

## 2023-12-26 NOTE — TELEPHONE ENCOUNTER
PATIENT IS OUT OF MEDICATION.    Rx Refill Request Telephone Encounter    Name:  Blanca Dempsey  : 1955     Medication Name:  DICLOFENAC   Dose (Optional):    75 MG  Quantity (Optional):    90  Directions (Optional):   TAKE 1 DAILY    ALLERGIES:   SEE LIST    Specific Pharmacy location:  [facility]    Date of last appointment:  05/15/2023  Date of next appointment:  2023    Best number to reach patient:  [phone]

## 2024-01-22 ENCOUNTER — OFFICE VISIT (OUTPATIENT)
Dept: PRIMARY CARE | Facility: CLINIC | Age: 69
End: 2024-01-22
Payer: MEDICARE

## 2024-01-22 ENCOUNTER — LAB (OUTPATIENT)
Dept: LAB | Facility: LAB | Age: 69
End: 2024-01-22
Payer: MEDICARE

## 2024-01-22 VITALS
BODY MASS INDEX: 43.41 KG/M2 | DIASTOLIC BLOOD PRESSURE: 88 MMHG | OXYGEN SATURATION: 96 % | HEIGHT: 67 IN | SYSTOLIC BLOOD PRESSURE: 138 MMHG | WEIGHT: 276.6 LBS | RESPIRATION RATE: 16 BRPM | HEART RATE: 84 BPM | TEMPERATURE: 98.4 F

## 2024-01-22 DIAGNOSIS — R51.9 FREQUENT HEADACHES: ICD-10-CM

## 2024-01-22 DIAGNOSIS — M72.2 PLANTAR FASCIITIS: ICD-10-CM

## 2024-01-22 DIAGNOSIS — I10 ESSENTIAL HYPERTENSION: ICD-10-CM

## 2024-01-22 DIAGNOSIS — E78.00 HYPERCHOLESTEROLEMIA: ICD-10-CM

## 2024-01-22 DIAGNOSIS — M19.90 ARTHRITIS: ICD-10-CM

## 2024-01-22 DIAGNOSIS — M79.671 PAIN OF BOTH HEELS: ICD-10-CM

## 2024-01-22 DIAGNOSIS — E66.01 CLASS 3 SEVERE OBESITY DUE TO EXCESS CALORIES WITH SERIOUS COMORBIDITY AND BODY MASS INDEX (BMI) OF 40.0 TO 44.9 IN ADULT (MULTI): ICD-10-CM

## 2024-01-22 DIAGNOSIS — M79.672 PAIN OF BOTH HEELS: ICD-10-CM

## 2024-01-22 DIAGNOSIS — Z12.11 COLON CANCER SCREENING: ICD-10-CM

## 2024-01-22 DIAGNOSIS — J01.90 ACUTE SINUSITIS, RECURRENCE NOT SPECIFIED, UNSPECIFIED LOCATION: ICD-10-CM

## 2024-01-22 DIAGNOSIS — E11.9 TYPE 2 DIABETES MELLITUS WITHOUT COMPLICATION, WITHOUT LONG-TERM CURRENT USE OF INSULIN (MULTI): ICD-10-CM

## 2024-01-22 DIAGNOSIS — F41.9 ANXIETY: ICD-10-CM

## 2024-01-22 DIAGNOSIS — Z00.00 MEDICARE ANNUAL WELLNESS VISIT, SUBSEQUENT: Primary | ICD-10-CM

## 2024-01-22 DIAGNOSIS — I10 HYPERTENSION, UNSPECIFIED TYPE: ICD-10-CM

## 2024-01-22 DIAGNOSIS — Z51.81 THERAPEUTIC DRUG MONITORING: ICD-10-CM

## 2024-01-22 PROBLEM — E66.813 CLASS 3 SEVERE OBESITY DUE TO EXCESS CALORIES WITH SERIOUS COMORBIDITY AND BODY MASS INDEX (BMI) OF 40.0 TO 44.9 IN ADULT: Status: ACTIVE | Noted: 2023-05-20

## 2024-01-22 LAB
ALBUMIN SERPL BCP-MCNC: 4.2 G/DL (ref 3.4–5)
ALP SERPL-CCNC: 104 U/L (ref 33–136)
ALT SERPL W P-5'-P-CCNC: 24 U/L (ref 7–45)
ANION GAP SERPL CALC-SCNC: 11 MMOL/L (ref 10–20)
AST SERPL W P-5'-P-CCNC: 21 U/L (ref 9–39)
BASOPHILS # BLD AUTO: 0.09 X10*3/UL (ref 0–0.1)
BASOPHILS NFR BLD AUTO: 0.6 %
BILIRUB SERPL-MCNC: 0.5 MG/DL (ref 0–1.2)
BUN SERPL-MCNC: 16 MG/DL (ref 6–23)
CALCIUM SERPL-MCNC: 9.5 MG/DL (ref 8.6–10.3)
CHLORIDE SERPL-SCNC: 105 MMOL/L (ref 98–107)
CHOLEST SERPL-MCNC: 171 MG/DL (ref 0–199)
CHOLESTEROL/HDL RATIO: 5.2
CO2 SERPL-SCNC: 27 MMOL/L (ref 21–32)
CREAT SERPL-MCNC: 1.01 MG/DL (ref 0.5–1.05)
EGFRCR SERPLBLD CKD-EPI 2021: 61 ML/MIN/1.73M*2
EOSINOPHIL # BLD AUTO: 0.87 X10*3/UL (ref 0–0.7)
EOSINOPHIL NFR BLD AUTO: 5.9 %
ERYTHROCYTE [DISTWIDTH] IN BLOOD BY AUTOMATED COUNT: 13.1 % (ref 11.5–14.5)
EST. AVERAGE GLUCOSE BLD GHB EST-MCNC: 137 MG/DL
GLUCOSE SERPL-MCNC: 151 MG/DL (ref 74–99)
HBA1C MFR BLD: 6.4 %
HCT VFR BLD AUTO: 46.9 % (ref 36–46)
HDLC SERPL-MCNC: 32.9 MG/DL
HGB BLD-MCNC: 14.8 G/DL (ref 12–16)
IMM GRANULOCYTES # BLD AUTO: 0.11 X10*3/UL (ref 0–0.7)
IMM GRANULOCYTES NFR BLD AUTO: 0.7 % (ref 0–0.9)
LDLC SERPL CALC-MCNC: 90 MG/DL
LYMPHOCYTES # BLD AUTO: 2.56 X10*3/UL (ref 1.2–4.8)
LYMPHOCYTES NFR BLD AUTO: 17.3 %
MCH RBC QN AUTO: 29.1 PG (ref 26–34)
MCHC RBC AUTO-ENTMCNC: 31.6 G/DL (ref 32–36)
MCV RBC AUTO: 92 FL (ref 80–100)
MONOCYTES # BLD AUTO: 0.6 X10*3/UL (ref 0.1–1)
MONOCYTES NFR BLD AUTO: 4 %
NEUTROPHILS # BLD AUTO: 10.59 X10*3/UL (ref 1.2–7.7)
NEUTROPHILS NFR BLD AUTO: 71.5 %
NON HDL CHOLESTEROL: 138 MG/DL (ref 0–149)
NRBC BLD-RTO: 0 /100 WBCS (ref 0–0)
PLATELET # BLD AUTO: 378 X10*3/UL (ref 150–450)
POTASSIUM SERPL-SCNC: 4.3 MMOL/L (ref 3.5–5.3)
PROT SERPL-MCNC: 7.5 G/DL (ref 6.4–8.2)
RBC # BLD AUTO: 5.09 X10*6/UL (ref 4–5.2)
SODIUM SERPL-SCNC: 139 MMOL/L (ref 136–145)
TRIGL SERPL-MCNC: 242 MG/DL (ref 0–149)
VLDL: 48 MG/DL (ref 0–40)
WBC # BLD AUTO: 14.8 X10*3/UL (ref 4.4–11.3)

## 2024-01-22 PROCEDURE — 80061 LIPID PANEL: CPT

## 2024-01-22 PROCEDURE — 1036F TOBACCO NON-USER: CPT | Performed by: FAMILY MEDICINE

## 2024-01-22 PROCEDURE — 1170F FXNL STATUS ASSESSED: CPT | Performed by: FAMILY MEDICINE

## 2024-01-22 PROCEDURE — 1160F RVW MEDS BY RX/DR IN RCRD: CPT | Performed by: FAMILY MEDICINE

## 2024-01-22 PROCEDURE — 3075F SYST BP GE 130 - 139MM HG: CPT | Performed by: FAMILY MEDICINE

## 2024-01-22 PROCEDURE — 3008F BODY MASS INDEX DOCD: CPT | Performed by: FAMILY MEDICINE

## 2024-01-22 PROCEDURE — 3048F LDL-C <100 MG/DL: CPT | Performed by: FAMILY MEDICINE

## 2024-01-22 PROCEDURE — 36415 COLL VENOUS BLD VENIPUNCTURE: CPT

## 2024-01-22 PROCEDURE — G0402 INITIAL PREVENTIVE EXAM: HCPCS | Performed by: FAMILY MEDICINE

## 2024-01-22 PROCEDURE — 3079F DIAST BP 80-89 MM HG: CPT | Performed by: FAMILY MEDICINE

## 2024-01-22 PROCEDURE — 3044F HG A1C LEVEL LT 7.0%: CPT | Performed by: FAMILY MEDICINE

## 2024-01-22 PROCEDURE — 99213 OFFICE O/P EST LOW 20 MIN: CPT | Performed by: FAMILY MEDICINE

## 2024-01-22 PROCEDURE — 83036 HEMOGLOBIN GLYCOSYLATED A1C: CPT

## 2024-01-22 PROCEDURE — 1159F MED LIST DOCD IN RCRD: CPT | Performed by: FAMILY MEDICINE

## 2024-01-22 PROCEDURE — 80053 COMPREHEN METABOLIC PANEL: CPT

## 2024-01-22 PROCEDURE — 85025 COMPLETE CBC W/AUTO DIFF WBC: CPT

## 2024-01-22 PROCEDURE — 4010F ACE/ARB THERAPY RXD/TAKEN: CPT | Performed by: FAMILY MEDICINE

## 2024-01-22 RX ORDER — ALPRAZOLAM 0.5 MG/1
0.5 TABLET ORAL DAILY
Qty: 30 TABLET | Refills: 2 | Status: SHIPPED | OUTPATIENT
Start: 2024-01-22

## 2024-01-22 RX ORDER — METOPROLOL SUCCINATE 50 MG/1
50 TABLET, EXTENDED RELEASE ORAL DAILY
Qty: 90 TABLET | Refills: 1 | Status: SHIPPED | OUTPATIENT
Start: 2024-01-22

## 2024-01-22 RX ORDER — BENAZEPRIL HYDROCHLORIDE 20 MG/1
20 TABLET ORAL DAILY
Qty: 90 TABLET | Refills: 1 | Status: SHIPPED | OUTPATIENT
Start: 2024-01-22

## 2024-01-22 RX ORDER — TOPIRAMATE 50 MG/1
50 TABLET, FILM COATED ORAL NIGHTLY
Qty: 90 TABLET | Refills: 1 | Status: SHIPPED | OUTPATIENT
Start: 2024-01-22

## 2024-01-22 RX ORDER — DICLOFENAC SODIUM 75 MG/1
75 TABLET, DELAYED RELEASE ORAL DAILY
Qty: 90 TABLET | Refills: 1 | Status: SHIPPED | OUTPATIENT
Start: 2024-01-22

## 2024-01-22 RX ORDER — AZITHROMYCIN 250 MG/1
TABLET, FILM COATED ORAL
Qty: 6 TABLET | Refills: 0 | Status: SHIPPED | OUTPATIENT
Start: 2024-01-22 | End: 2024-01-26

## 2024-01-22 RX ORDER — METHYLPREDNISOLONE 4 MG/1
TABLET ORAL
Qty: 21 TABLET | Refills: 0 | Status: SHIPPED | OUTPATIENT
Start: 2024-01-22

## 2024-01-22 RX ORDER — VENLAFAXINE HYDROCHLORIDE 37.5 MG/1
37.5 CAPSULE, EXTENDED RELEASE ORAL 3 TIMES DAILY
Qty: 90 CAPSULE | Refills: 1 | Status: SHIPPED | OUTPATIENT
Start: 2024-01-22 | End: 2024-03-22

## 2024-01-22 ASSESSMENT — ACTIVITIES OF DAILY LIVING (ADL)
DRESSING: INDEPENDENT
GROCERY_SHOPPING: INDEPENDENT
BATHING: INDEPENDENT
TAKING_MEDICATION: INDEPENDENT
MANAGING_FINANCES: INDEPENDENT
DOING_HOUSEWORK: INDEPENDENT

## 2024-01-22 ASSESSMENT — PATIENT HEALTH QUESTIONNAIRE - PHQ9
1. LITTLE INTEREST OR PLEASURE IN DOING THINGS: SEVERAL DAYS
10. IF YOU CHECKED OFF ANY PROBLEMS, HOW DIFFICULT HAVE THESE PROBLEMS MADE IT FOR YOU TO DO YOUR WORK, TAKE CARE OF THINGS AT HOME, OR GET ALONG WITH OTHER PEOPLE: SOMEWHAT DIFFICULT
SUM OF ALL RESPONSES TO PHQ9 QUESTIONS 1 AND 2: 2
2. FEELING DOWN, DEPRESSED OR HOPELESS: SEVERAL DAYS

## 2024-01-22 NOTE — PROGRESS NOTES
"Subjective   Patient ID: Blanca Dempsey is a 68 y.o. female who presents for Medicare Annual Wellness Visit Subsequent, Anxiety, and Heel Pain.  HPI  Patient presents today for a Medicare AWV.    Also presents today for a follow-up on Anxiety. Is taking Xanax 0.5 MG. No issues with the medication. Rates the anxiety a 1/10 over the past 7 days. Reports that the medication gives 80% anxiety control/relief. OARRS reviewed today. Controlled substance contract signed today. Last took medication 1 month ago. Take it only when needed.    Complaining of bilateral heel pain x \"a long time\". States it feels like she is walking on \"bunched up socks\". Woke up with it one morning.    States she is still getting small \"twinges\" of pain periodically in her chest. Has mentioned this before. States \"I just feel like I have to rub it out\".   Pain lasts 10 to 30 seconds.  Does not radiate anywhere.  Seems to localize in the same spot between the upper breast and shoulder.      Taking current medications which were reviewed.  Problem list discussed.    Overall doing well.  Eating okay.  Staying active.    Has no other new problem /question.     ROS  Constitutional- No activity change. No appetite change.  Eyes- Denies vision changes.  Respiratory- No shortness of breath.  Cardiovascular- No palpitations.   GI- No nausea or vomiting. No diarrhea or constipation. Denies abdominal pain.  Musculoskeletal- Denies joint swelling.  Extremities- No edema.  Neurological- Denies headaches. Denies dizziness.  Skin- No rashes.  Psychiatric/Behavioral- Denies significant anxiety, or depressed mood.     Objective     /88   Pulse 84   Temp 36.9 °C (98.4 °F)   Resp 16   Ht 1.702 m (5' 7\")   Wt 125 kg (276 lb 9.6 oz)   SpO2 96%   BMI 43.32 kg/m²     Allergies   Allergen Reactions    Oxycodone Itching     After surgery 11/2017 allergy discovered. Itching relieved after medication stopped    Buspirone Hcl Nausea Only    Penicillins Unknown "     Jittery       Constitutional-- Well-nourished.  No distress  Head- unremarkable.  Eyes- PERRL.  Conjunctiva normal.  Nose-moderate sinus congestion with vascular sinus pressure noted  Throat- Oropharynx is clear and moist.  Neck- Supple with no thyromegaly.  No significant cervical adenopathy noted.  Pulmonary/Chest- Breath sounds normal with normal effort.  No wheezing.  Heart- Regular rate and rhythm.  No murmur.  Abdomen- Soft and non-tender.  No masses noted.  Musculoskeletal-OA changes hands and feet noted.  Mild heel symptoms consistent with planter fasciitis  Extremities- No edema.   Neurological- Alert.  No noted deficits.  Skin- Warm.  No rashes.  Psychiatric/Behavioral- Mood and affect normal.  Behavior normal.     Assessment/Plan   1. Medicare annual wellness visit, subsequent        2. Type 2 diabetes mellitus without complication, without long-term current use of insulin (CMS/[facility])  CBC and Auto Differential    Comprehensive metabolic panel    Lipid panel    Albumin, urine, random    Hemoglobin A1c      3. Anxiety  Opiate/Opioid/Benzo Prescription Compliance    OOB Internal Tracking    venlafaxine XR (Effexor-XR) 37.5 mg 24 hr capsule    ALPRAZolam (Xanax) 0.5 mg tablet    CANCELED: Opiate/Opioid/Benzo Prescription Compliance      4. Therapeutic drug monitoring  Opiate/Opioid/Benzo Prescription Compliance    OOB Internal Tracking    CANCELED: Opiate/Opioid/Benzo Prescription Compliance      5. Essential hypertension  CBC and Auto Differential    Comprehensive metabolic panel    Lipid panel      6. Hypercholesterolemia  Lipid panel      7. Hypertension, unspecified type  benazepril (Lotensin) 20 mg tablet    metoprolol succinate XL (Toprol-XL) 50 mg 24 hr tablet      8. Acute sinusitis, recurrence not specified, unspecified location  azithromycin (Zithromax) 250 mg tablet      9. Arthritis  diclofenac (Voltaren) 75 mg EC tablet    methylPREDNISolone (Medrol Dospak) 4 mg tablets      10. Frequent  headaches  topiramate (Topamax) 50 mg tablet      11. BMI 40.0-44.9, adult (CMS/[facility])        12. Class 3 severe obesity due to excess calories with serious comorbidity and body mass index (BMI) of 40.0 to 44.9 in adult (CMS/[facility])        13. Colon cancer screening  Cologuard® colon cancer screening    Cologuard® colon cancer screening      14. Pain of both heels  methylPREDNISolone (Medrol Dospak) 4 mg tablets      15. Plantar fasciitis  methylPREDNISolone (Medrol Dospak) 4 mg tablets             Long talk. Treatment options reviewed.    Medicare wellness questionnaire reviewed in detail.   No problems with activities of daily living. Home safety issues reviewed.   Reminded to have an updated will if needed.    Reviewed most recent lab work with patient.  Advised patient to remain up to date on routine maintenance and health screening.      Counseled the patient on anxiety.     Diabetes Mellitus controlled. Continue to monitor glucose levels. Educated on diabetes, low-calorie diet and exercise. Recommended eye exam once a year. Educated on diabetic foot care.    Hypertension controlled.     Educated on sinusitis.  Start antibiotic.  Over-the-counter cold medicine as needed for symptom relief  Continue and take your medications as prescribed.    Hypertension controlled  Health Maintenance issues discussed.    Importance of healthy diet and regular exercise regimen discussed.    We will contact you with any test results ordered. If you do not hear from us, please contact.    Follow-up as instructed or sooner if any problems or symptoms do not resolve as expected.        Scribe Attestation  By signing my name below, Dinah PATIÑO Scribe   attest that this documentation has been prepared under the direction and in the presence of Zaid Lux MD.

## 2024-01-25 ENCOUNTER — TELEPHONE (OUTPATIENT)
Dept: PRIMARY CARE | Facility: CLINIC | Age: 69
End: 2024-01-25
Payer: MEDICARE

## 2024-01-25 NOTE — TELEPHONE ENCOUNTER
----- Message from Zaid Lux MD sent at 1/25/2024 11:20 AM EST -----  Labs are within normal limits or stable with your A1c being 6.4 indicating good sugar control.  A1c was a little worse than last year.  Recommend improving low sugar low-fat healthy diet and follow-up per routine.  Please let her know

## 2024-01-29 ENCOUNTER — LAB (OUTPATIENT)
Dept: LAB | Facility: LAB | Age: 69
End: 2024-01-29
Payer: MEDICARE

## 2024-01-29 DIAGNOSIS — E11.9 TYPE 2 DIABETES MELLITUS WITHOUT COMPLICATION, WITHOUT LONG-TERM CURRENT USE OF INSULIN (MULTI): ICD-10-CM

## 2024-01-29 LAB
CREAT UR-MCNC: 44.4 MG/DL (ref 20–320)
MICROALBUMIN UR-MCNC: <7 MG/L
MICROALBUMIN/CREAT UR: NORMAL MG/G{CREAT}

## 2024-01-29 PROCEDURE — 82570 ASSAY OF URINE CREATININE: CPT

## 2024-01-29 PROCEDURE — 82043 UR ALBUMIN QUANTITATIVE: CPT

## 2024-02-07 LAB — NONINV COLON CA DNA+OCC BLD SCRN STL QL: NEGATIVE

## 2024-02-14 ENCOUNTER — TELEPHONE (OUTPATIENT)
Dept: PRIMARY CARE | Facility: CLINIC | Age: 69
End: 2024-02-14
Payer: MEDICARE

## 2024-02-14 NOTE — TELEPHONE ENCOUNTER
----- Message from Zaid Lux MD sent at 2/13/2024  9:14 PM EST -----  Cologuard test is negative.  This is good news.  We will likely repeat this in 3 years.  Please record and let her know.

## 2024-03-22 DIAGNOSIS — F41.9 ANXIETY: ICD-10-CM

## 2024-03-22 RX ORDER — VENLAFAXINE HYDROCHLORIDE 37.5 MG/1
37.5 CAPSULE, EXTENDED RELEASE ORAL 3 TIMES DAILY
Qty: 90 CAPSULE | Refills: 1 | Status: SHIPPED | OUTPATIENT
Start: 2024-03-22 | End: 2024-05-22 | Stop reason: SDUPTHER

## 2024-05-22 DIAGNOSIS — F41.9 ANXIETY: ICD-10-CM

## 2024-05-22 RX ORDER — VENLAFAXINE HYDROCHLORIDE 37.5 MG/1
37.5 CAPSULE, EXTENDED RELEASE ORAL 3 TIMES DAILY
Qty: 90 CAPSULE | Refills: 2 | Status: SHIPPED | OUTPATIENT
Start: 2024-05-22

## 2024-05-22 NOTE — TELEPHONE ENCOUNTER
Rx Refill Request Telephone Encounter    Name:  Blanca Dempsey  : 1955     Medication Name:  VENLAFAXINE XR  Dose (Optional):    37.5 MG  Quantity (Optional):    270  Directions (Optional):   TAKE 1 TID    ALLERGIES:   SEE LIST    Specific Pharmacy location:  [facility] [place] [facility]    Date of last appointment:  2024  Date of next appointment:  NONE    Best number to reach patient:  [phone]

## 2024-06-20 DIAGNOSIS — I10 HYPERTENSION, UNSPECIFIED TYPE: ICD-10-CM

## 2024-06-20 RX ORDER — BENAZEPRIL HYDROCHLORIDE 20 MG/1
20 TABLET ORAL DAILY
Qty: 30 TABLET | Refills: 0 | Status: SHIPPED | OUTPATIENT
Start: 2024-06-20

## 2024-07-30 DIAGNOSIS — M19.90 ARTHRITIS: ICD-10-CM

## 2024-07-30 DIAGNOSIS — I10 HYPERTENSION, UNSPECIFIED TYPE: ICD-10-CM

## 2024-07-30 RX ORDER — DICLOFENAC SODIUM 75 MG/1
75 TABLET, DELAYED RELEASE ORAL DAILY
Qty: 30 TABLET | Refills: 0 | Status: SHIPPED | OUTPATIENT
Start: 2024-07-30

## 2024-07-30 RX ORDER — BENAZEPRIL HYDROCHLORIDE 20 MG/1
20 TABLET ORAL DAILY
Qty: 30 TABLET | Refills: 0 | Status: SHIPPED | OUTPATIENT
Start: 2024-07-30

## 2024-07-30 NOTE — TELEPHONE ENCOUNTER
Rx Refill Request Telephone Encounter    Name:  Blanca Dempsey  : 1955     Medication Name:  benazepril (Lotensin)   Dose (Optional):    20mg  Quantity (Optional):    30  Directions (Optional):          TAKE ONE TABLET BY MOUTH once DAILY         Medication Name:   diclofenac (Voltaren)   Dose (Optional):    75 mg  Quantity (Optional):   90  Directions (Optional):   Take 1 tablet (75 mg) by mouth once daily.       ALLERGIES:   oxycodone, buspirone Hcl, pcn    Specific Pharmacy location:  [id number] [address] [address] [facility]   [address] [facility] [address]     Date of last appointment:  24  Date of next appointment:  24    Best number to reach patient:  [phone]

## 2024-08-26 ENCOUNTER — APPOINTMENT (OUTPATIENT)
Dept: PRIMARY CARE | Facility: CLINIC | Age: 69
End: 2024-08-26
Payer: MEDICARE

## 2024-08-26 VITALS
DIASTOLIC BLOOD PRESSURE: 80 MMHG | WEIGHT: 280 LBS | HEART RATE: 82 BPM | BODY MASS INDEX: 43.95 KG/M2 | RESPIRATION RATE: 18 BRPM | HEIGHT: 67 IN | OXYGEN SATURATION: 98 % | SYSTOLIC BLOOD PRESSURE: 124 MMHG

## 2024-08-26 DIAGNOSIS — E11.9 TYPE 2 DIABETES MELLITUS WITHOUT COMPLICATION, WITHOUT LONG-TERM CURRENT USE OF INSULIN (MULTI): ICD-10-CM

## 2024-08-26 DIAGNOSIS — I10 ESSENTIAL HYPERTENSION: Primary | ICD-10-CM

## 2024-08-26 DIAGNOSIS — Z51.81 THERAPEUTIC DRUG MONITORING: ICD-10-CM

## 2024-08-26 DIAGNOSIS — Z12.31 ENCOUNTER FOR SCREENING MAMMOGRAM FOR MALIGNANT NEOPLASM OF BREAST: ICD-10-CM

## 2024-08-26 DIAGNOSIS — Z12.11 SCREEN FOR COLON CANCER: ICD-10-CM

## 2024-08-26 DIAGNOSIS — F41.9 ANXIETY: ICD-10-CM

## 2024-08-26 DIAGNOSIS — E78.00 HYPERCHOLESTEROLEMIA: ICD-10-CM

## 2024-08-26 PROCEDURE — 1159F MED LIST DOCD IN RCRD: CPT | Performed by: FAMILY MEDICINE

## 2024-08-26 PROCEDURE — 3062F POS MACROALBUMINURIA REV: CPT | Performed by: FAMILY MEDICINE

## 2024-08-26 PROCEDURE — 3044F HG A1C LEVEL LT 7.0%: CPT | Performed by: FAMILY MEDICINE

## 2024-08-26 PROCEDURE — 3079F DIAST BP 80-89 MM HG: CPT | Performed by: FAMILY MEDICINE

## 2024-08-26 PROCEDURE — 3048F LDL-C <100 MG/DL: CPT | Performed by: FAMILY MEDICINE

## 2024-08-26 PROCEDURE — 1036F TOBACCO NON-USER: CPT | Performed by: FAMILY MEDICINE

## 2024-08-26 PROCEDURE — G2211 COMPLEX E/M VISIT ADD ON: HCPCS | Performed by: FAMILY MEDICINE

## 2024-08-26 PROCEDURE — 1160F RVW MEDS BY RX/DR IN RCRD: CPT | Performed by: FAMILY MEDICINE

## 2024-08-26 PROCEDURE — 3074F SYST BP LT 130 MM HG: CPT | Performed by: FAMILY MEDICINE

## 2024-08-26 PROCEDURE — 4010F ACE/ARB THERAPY RXD/TAKEN: CPT | Performed by: FAMILY MEDICINE

## 2024-08-26 PROCEDURE — 1124F ACP DISCUSS-NO DSCNMKR DOCD: CPT | Performed by: FAMILY MEDICINE

## 2024-08-26 PROCEDURE — 3008F BODY MASS INDEX DOCD: CPT | Performed by: FAMILY MEDICINE

## 2024-08-26 PROCEDURE — 99213 OFFICE O/P EST LOW 20 MIN: CPT | Performed by: FAMILY MEDICINE

## 2024-08-26 NOTE — PROGRESS NOTES
"Subjective   Patient ID: Blanca Dempsey is a 69 y.o. female who presents for Anxiety and Hypertension.    HPI    Patient presents today for HTN follow up.  She does try to eat a low sodium diet. She does/not exercise. She does check her BP at home.  Last BW was 1/22/24.     Patient presents today for anxiety follow up. Rates the mood a 2/10 over the past 7 days. Reports that the medication gives 90% relief. OARRS reviewed today. Controlled substance agreement signed. Last took medication 1 week ago. States that she does not take this medication very often.     Advanced Care Planning  Blanca Dempsey and I discussed their advance care plan preferences such as living will, and durable health care power of , which include: yes Attempt Resuscitation, no Intubate & Ventilate, yes Comfort Care or Life- Sustaning Care. I reminded patient to talk with their health care agent, Patric Dempsey, about their health care goals. Note reflects patient's free will and care goals as expressed to me.     Taking current medications which were reviewed.  Problem list discussed.    Overall doing well.  Eating okay.  Staying active.    Has no other new problem /question.        ROS  Constitutional- No activity change. No appetite change.  Eyes- Denies vision changes.  Respiratory- No shortness of breath.  Cardiovascular- No palpitations. No chest pain.  GI- No nausea or vomiting. No diarrhea or constipation. Denies abdominal pain.  Musculoskeletal- Denies joint swelling.  Extremities- No edema.  Neurological- Denies headaches. Denies dizziness.  Skin- No rashes.  Psychiatric/Behavioral- Denies significant anxiety, or depressed mood.     Objective     /80   Pulse 82   Resp 18   Ht 1.702 m (5' 7\")   Wt 127 kg (280 lb)   SpO2 98%   BMI 43.85 kg/m²     Allergies   Allergen Reactions    Oxycodone Itching     After surgery 11/2017 allergy discovered. Itching relieved after medication stopped    Buspirone Hcl Nausea Only    " Penicillins Unknown     Jittery       Constitutional-- Well-nourished.  No distress  Head- unremarkable.  Eyes- PERRL.  Conjunctiva normal.  Nose- Normal.  No rhinorrhea noted.  Throat- Oropharynx is clear and moist.  Neck- Supple with no thyromegaly.  No significant cervical adenopathy noted.  Pulmonary/Chest- Breath sounds normal with normal effort.  No wheezing.  Heart- Regular rate and rhythm.  No murmur.  Abdomen- Soft and non-tender.  No masses noted.  Musculoskeletal- Normal ROM.  No significant joint swelling  Extremities- No edema.   Neurological- Alert.  No noted deficits.  Skin- Warm.  No rashes.  Psychiatric/Behavioral- Mood and affect normal.  Behavior normal.     Assessment/Plan   1. Essential hypertension        2. Anxiety        3. Therapeutic drug monitoring        4. Encounter for screening mammogram for malignant neoplasm of breast  BI mammo bilateral screening tomosynthesis      5. Screen for colon cancer        6. Type 2 diabetes mellitus without complication, without long-term current use of insulin (Multi)        7. Hypercholesterolemia               Long talk. Treatment options reviewed.    Reviewed most recent lab work with patient. Advised patient to remain up to date on routine maintenance and health screening.      Diabetes Mellitus controlled. Continue to monitor glucose levels. Educated on diabetes, low-calorie diet and exercise. Recommended eye exam once a year. Educated on diabetic foot care.    Counseled the patient on anxiety.  Stable  Hypertension controlled  Continue and take your medications as prescribed.    Health Maintenance issues discussed.    Importance of healthy diet and regular exercise regimen discussed.    We will contact you with any test results ordered. If you do not hear from us, please contact.    Follow-up as instructed or sooner if any problems or symptoms do not resolve as expected.              Scribe Attestation  By signing my name below, I, Dinah Dempsey ,  Scribe   attest that this documentation has been prepared under the direction and in the presence of Zaid Lux MD.

## 2024-09-03 DIAGNOSIS — R51.9 FREQUENT HEADACHES: ICD-10-CM

## 2024-09-03 DIAGNOSIS — M19.90 ARTHRITIS: ICD-10-CM

## 2024-09-03 DIAGNOSIS — I10 HYPERTENSION, UNSPECIFIED TYPE: ICD-10-CM

## 2024-09-03 RX ORDER — METOPROLOL SUCCINATE 50 MG/1
50 TABLET, EXTENDED RELEASE ORAL DAILY
Qty: 90 TABLET | Refills: 1 | Status: SHIPPED | OUTPATIENT
Start: 2024-09-03

## 2024-09-03 RX ORDER — DICLOFENAC SODIUM 75 MG/1
75 TABLET, DELAYED RELEASE ORAL DAILY
Qty: 90 TABLET | Refills: 1 | Status: SHIPPED | OUTPATIENT
Start: 2024-09-03

## 2024-09-03 RX ORDER — TOPIRAMATE 50 MG/1
50 TABLET, FILM COATED ORAL NIGHTLY
Qty: 90 TABLET | Refills: 1 | Status: SHIPPED | OUTPATIENT
Start: 2024-09-03

## 2024-09-03 RX ORDER — BENAZEPRIL HYDROCHLORIDE 20 MG/1
20 TABLET ORAL DAILY
Qty: 90 TABLET | Refills: 1 | Status: SHIPPED | OUTPATIENT
Start: 2024-09-03

## 2024-09-03 NOTE — TELEPHONE ENCOUNTER
Rx Refill Request Telephone Encounter    Name:  Blanca Dempsey  : 1955     benazepril (Lotensin) 20 mg tablet [[id number]]    Order Details  Dose: 20 mg Route: oral Frequency: Daily   Dispense Quantity: 90 tablet Refills: 2    Note to Pharmacy:         Sig: Take 1 tablet (20 mg) by mouth once daily.       diclofenac (Voltaren) 75 mg EC tablet [[id number]]    Order Details    Dose: 75 mg Route: oral Frequency: Daily   Dispense Quantity: 90 tablet Refills: 2    Note to Pharmacy:          Sig: Take 1 tablet (75 mg) by mouth once daily.             metoprolol succinate XL (Toprol-XL) 50 mg 24 hr tablet [[id number]]    Order Details  Dose: 50 mg Route: oral Frequency: Daily   Dispense Quantity: 90 tablet Refills: 2          Sig: Take 1 tablet (50 mg) by mouth once daily.       topiramate (Topamax) 50 mg tablet [[id number]]    Order Details    Dose: 50 mg Route: oral Frequency: Nightly   Dispense Quantity: 90 tablet Refills: 2          Sig: Take 1 tablet (50 mg) by mouth once daily at bedtime.           ALLERGIES:   SEE LIST    Specific Pharmacy location:  [facility]    Date of last appointment:  2024  Date of next appointment:  NONE    Best number to reach patient:  [phone]

## 2024-09-05 DIAGNOSIS — F41.9 ANXIETY: ICD-10-CM

## 2024-09-05 NOTE — TELEPHONE ENCOUNTER
MEDICATION PENDED  Rx Refill Request Telephone Encounter    Name:  Blanca Dempsey  : 1955       venlafaxine XR (Effexor-XR) 37.5 mg 24 hr capsule [[id number]]    Order Details  Dose: 37.5 mg Route: oral Frequency: 3 times daily   Dispense Quantity: 270 capsule Refills: 2          Sig: Take 1 capsule (37.5 mg) by mouth 3 times a day.     ALLERGIES:   SEE LIST    Specific Pharmacy location:  [facility]    Date of last appointment:  2024  Date of next appointment:  NONE    Best number to reach patient:  [phone]

## 2024-09-06 RX ORDER — VENLAFAXINE HYDROCHLORIDE 37.5 MG/1
37.5 CAPSULE, EXTENDED RELEASE ORAL 3 TIMES DAILY
Qty: 90 CAPSULE | Refills: 4 | Status: SHIPPED | OUTPATIENT
Start: 2024-09-06

## 2025-02-17 DIAGNOSIS — F41.9 ANXIETY: ICD-10-CM

## 2025-02-17 RX ORDER — VENLAFAXINE HYDROCHLORIDE 37.5 MG/1
37.5 CAPSULE, EXTENDED RELEASE ORAL 3 TIMES DAILY
Qty: 90 CAPSULE | Refills: 0 | Status: SHIPPED | OUTPATIENT
Start: 2025-02-17

## 2025-02-17 NOTE — TELEPHONE ENCOUNTER
MEDICATION PENDED  Rx Refill Request Telephone Encounter    Name:  Blanca Dempsey  : 1955     Medication Name:  VENLAFAXINE XR  Dose (Optional):    37.5 MG  Quantity (Optional):    90  Directions (Optional):   1 CAPSULE THREE TIMES A DAY    ALLERGIES:   ON FILE    Specific Pharmacy location:  [facility] [place] [facility]    Date of last appointment:  24  Date of next appointment:  NONE    Best number to reach patient:  [phone]

## 2025-03-19 DIAGNOSIS — M19.90 ARTHRITIS: ICD-10-CM

## 2025-03-19 DIAGNOSIS — I10 HYPERTENSION, UNSPECIFIED TYPE: ICD-10-CM

## 2025-03-19 DIAGNOSIS — F41.9 ANXIETY: ICD-10-CM

## 2025-03-19 RX ORDER — DICLOFENAC SODIUM 75 MG/1
75 TABLET, DELAYED RELEASE ORAL DAILY
Qty: 90 TABLET | Refills: 1 | Status: SHIPPED | OUTPATIENT
Start: 2025-03-19

## 2025-03-19 RX ORDER — BENAZEPRIL HYDROCHLORIDE 20 MG/1
20 TABLET ORAL DAILY
Qty: 90 TABLET | Refills: 1 | Status: SHIPPED | OUTPATIENT
Start: 2025-03-19

## 2025-03-19 RX ORDER — VENLAFAXINE HYDROCHLORIDE 37.5 MG/1
37.5 CAPSULE, EXTENDED RELEASE ORAL 3 TIMES DAILY
Qty: 90 CAPSULE | Refills: 1 | Status: SHIPPED | OUTPATIENT
Start: 2025-03-19

## 2025-03-19 RX ORDER — METOPROLOL SUCCINATE 50 MG/1
50 TABLET, EXTENDED RELEASE ORAL DAILY
Qty: 90 TABLET | Refills: 1 | Status: SHIPPED | OUTPATIENT
Start: 2025-03-19

## 2025-03-19 NOTE — TELEPHONE ENCOUNTER
Rx Refill Request Telephone Encounter    Name:  Blanca Dempsey  :  [id number]  venlafaxine XR (Effexor-XR) 37.5 mg 24 hr capsule [[id number]]    Order Details  Dose: 37.5 mg Route: oral Frequency: 3 times daily   Dispense Quantity: 90 capsule Refills: 0          Sig: Take 1 capsule (37.5 mg) by mouth 3 times a day.   benazepril (Lotensin) 20 mg tablet [[id number]]    Order Details  Dose: 20 mg Route: oral Frequency: Daily   Dispense Quantity: 90 tablet Refills: 1          Sig: Take 1 tablet (20 mg) by mouth once daily.   diclofenac (Voltaren) 75 mg EC tablet [[id number]]    Order Details  Dose: 75 mg Route: oral Frequency: Daily   Dispense Quantity: 90 tablet Refills: 1          Sig: Take 1 tablet (75 mg) by mouth once daily.   metoprolol succinate XL (Toprol-XL) 50 mg 24 hr tablet [[id number]]    Order Details    Dose: 50 mg Route: oral Frequency: Daily   Dispense Quantity: 90 tablet Refills: 1          Sig: Take 1 tablet (50 mg) by mouth once daily.           Specific Pharmacy location:    [id number] [address] [address] [facility]  [address] [facility] [address]  Phone: [phone]  Fax: [phone]  LEN #: --     Date of last appointment:  24  Date of next appointment:  3/24/25  Best number to reach patient:  [phone]

## 2025-03-24 ENCOUNTER — APPOINTMENT (OUTPATIENT)
Dept: PRIMARY CARE | Facility: CLINIC | Age: 70
End: 2025-03-24
Payer: MEDICARE

## 2025-03-24 VITALS
BODY MASS INDEX: 43.73 KG/M2 | SYSTOLIC BLOOD PRESSURE: 128 MMHG | HEART RATE: 87 BPM | RESPIRATION RATE: 18 BRPM | TEMPERATURE: 98.6 F | OXYGEN SATURATION: 97 % | HEIGHT: 67 IN | WEIGHT: 278.6 LBS | DIASTOLIC BLOOD PRESSURE: 70 MMHG

## 2025-03-24 DIAGNOSIS — E78.00 HYPERCHOLESTEROLEMIA: ICD-10-CM

## 2025-03-24 DIAGNOSIS — D64.9 ANEMIA, UNSPECIFIED TYPE: ICD-10-CM

## 2025-03-24 DIAGNOSIS — E11.9 TYPE 2 DIABETES MELLITUS WITHOUT COMPLICATION, WITHOUT LONG-TERM CURRENT USE OF INSULIN: ICD-10-CM

## 2025-03-24 DIAGNOSIS — R53.83 OTHER FATIGUE: ICD-10-CM

## 2025-03-24 DIAGNOSIS — I10 ESSENTIAL HYPERTENSION: Primary | ICD-10-CM

## 2025-03-24 DIAGNOSIS — Z51.81 THERAPEUTIC DRUG MONITORING: ICD-10-CM

## 2025-03-24 PROCEDURE — 3008F BODY MASS INDEX DOCD: CPT | Performed by: FAMILY MEDICINE

## 2025-03-24 PROCEDURE — 99214 OFFICE O/P EST MOD 30 MIN: CPT | Performed by: FAMILY MEDICINE

## 2025-03-24 PROCEDURE — 1170F FXNL STATUS ASSESSED: CPT | Performed by: FAMILY MEDICINE

## 2025-03-24 PROCEDURE — 1123F ACP DISCUSS/DSCN MKR DOCD: CPT | Performed by: FAMILY MEDICINE

## 2025-03-24 PROCEDURE — 1036F TOBACCO NON-USER: CPT | Performed by: FAMILY MEDICINE

## 2025-03-24 PROCEDURE — 1159F MED LIST DOCD IN RCRD: CPT | Performed by: FAMILY MEDICINE

## 2025-03-24 PROCEDURE — 3074F SYST BP LT 130 MM HG: CPT | Performed by: FAMILY MEDICINE

## 2025-03-24 PROCEDURE — 4010F ACE/ARB THERAPY RXD/TAKEN: CPT | Performed by: FAMILY MEDICINE

## 2025-03-24 PROCEDURE — 3078F DIAST BP <80 MM HG: CPT | Performed by: FAMILY MEDICINE

## 2025-03-24 ASSESSMENT — ACTIVITIES OF DAILY LIVING (ADL)
GROCERY_SHOPPING: INDEPENDENT
TAKING_MEDICATION: INDEPENDENT
MANAGING_FINANCES: INDEPENDENT
DRESSING: INDEPENDENT
DOING_HOUSEWORK: INDEPENDENT
BATHING: INDEPENDENT

## 2025-03-24 ASSESSMENT — ENCOUNTER SYMPTOMS
OCCASIONAL FEELINGS OF UNSTEADINESS: 1
DEPRESSION: 0

## 2025-03-24 NOTE — PROGRESS NOTES
"Subjective   Patient ID: Blanca Dempsey is a 70 y.o. female who presents for Follow-up and Anxiety.  HPI Medicare Annual Wellness visit     Anxiety follow up  Taking the Xanax  Working well    100% effective   Denies any side effects   OARRS reviewed today   CSA 8/26/2024  Last took 2 months ago.  Patient states she feels tired all the time.   She states she will be up for a hour and then feel like to has to go back to sleep.   Patient left without USD.  Will bring urine in tomorrow    Taking current medications which were reviewed.  Problem list discussed.    Overall doing well.  Eating okay.  Staying active.    Has no other new problem /question.     ROS  Constitutional- No activity change. No appetite change.  Eyes- Denies vision changes.  Respiratory- No shortness of breath.  Cardiovascular- No palpitations. No chest pain.  GI- No nausea or vomiting. No diarrhea or constipation. Denies abdominal pain.  Musculoskeletal- Denies joint swelling.  Neurological- Denies headaches. Denies dizziness.  Skin- No rashes.  Psychiatric/Behavioral- Denies significant anxiety, or depressed mood.     Objective     /70   Pulse 87   Temp 37 °C (98.6 °F) (Temporal)   Resp 18   Ht 1.702 m (5' 7\")   Wt 126 kg (278 lb 9.6 oz)   SpO2 97%   BMI 43.63 kg/m²     Allergies   Allergen Reactions    Oxycodone Itching     After surgery 11/2017 allergy discovered. Itching relieved after medication stopped    Buspirone Hcl Nausea Only    Penicillins Unknown     Jittery       Constitutional-- Well-nourished.  No distress  Head- unremarkable.  Eyes- PERRL.  Conjunctiva normal.  Nose- Normal.  No rhinorrhea noted.  Throat- Oropharynx is clear and moist.  Neck- Supple with no thyromegaly.  No significant cervical adenopathy noted.  Pulmonary/Chest- Breath sounds normal with normal effort.  No wheezing.  Heart- Regular rate and rhythm.  No murmur.  Abdomen- Soft and non-tender.  No masses noted.  Musculoskeletal- Normal ROM.  No " significant joint swelling  Extremities- No edema.   Neurological- Alert.  No noted deficits.  Skin- Warm.  No rashes.  Psychiatric/Behavioral- Mood and affect normal.  Behavior normal.     Assessment/Plan   1. Essential hypertension  Comprehensive Metabolic Panel    CBC and Auto Differential    Comprehensive Metabolic Panel    CBC and Auto Differential      2. Hypercholesterolemia  Lipid Panel    Lipid Panel      3. Therapeutic drug monitoring        4. Type 2 diabetes mellitus without complication, without long-term current use of insulin (Multi)  Comprehensive Metabolic Panel    CBC and Auto Differential    Hemoglobin A1C    Comprehensive Metabolic Panel    CBC and Auto Differential    Hemoglobin A1C      5. Anemia, unspecified type  Iron    Iron      6. Other fatigue  TSH with reflex to Free T4 if abnormal    TSH with reflex to Free T4 if abnormal      7. BMI 40.0-44.9, adult (Multi)               Long talk. Treatment options reviewed.  Spent 30 minutes with the patient, with at least 1/2 of the time spent in counseling and instruction.    Reviewed most recent lab work with patient. Advised patient to remain up to date on routine maintenance and health screening.    Hypertension controlled.     Discussed Diabetes Mellitus. Continue to monitor glucose levels. Educated on diabetes, low-calorie diet and exercise. Recommended eye exam once a year. Educated on diabetic foot care.  History of anemia      Discussed importance of natural sources of nutrition.  Advised patient to consume vegetables, salads, fruits, nuts, and proteins such as fish and chicken.  Discussed portion control.      Discussed the importance of routine stretching and exercise.     Complete blood work as discussed. Advised patient to remain properly hydrated.     Continue and take your medications as prescribed.    Health Maintenance issues discussed.    Importance of healthy diet and regular exercise regimen discussed.    We will contact you with  any test results ordered. If you do not hear from us, please contact.    Follow-up as instructed or sooner if any problems or symptoms do not resolve as expected.      Scribe Attestation  By signing my name below, IDinah Scribe   attest that this documentation has been prepared under the direction and in the presence of Zaid Lux MD.

## 2025-05-30 DIAGNOSIS — F41.9 ANXIETY: ICD-10-CM

## 2025-05-30 RX ORDER — VENLAFAXINE HYDROCHLORIDE 37.5 MG/1
37.5 CAPSULE, EXTENDED RELEASE ORAL 3 TIMES DAILY
Qty: 90 CAPSULE | Refills: 1 | Status: SHIPPED | OUTPATIENT
Start: 2025-05-30

## 2025-05-30 NOTE — TELEPHONE ENCOUNTER
Rx Refill Request Telephone Encounter    Name:  Blanca Dempsey  :  [id number]  venlafaxine XR (Effexor-XR) 37.5 mg 24 hr capsule [[id number]]    Order Details  Dose: 37.5 mg Route: oral Frequency: 3 times daily   Dispense Quantity: 90 capsule (30 day supply) Refills: 1    Duration: -- Dispense As Written: No          Sig: Take 1 capsule (37.5 mg) by mouth 3 times a day.     Specific Pharmacy location:    [id number] [place] [id number] [place] [id number] [facility] [address]  Phone: [phone]  Fax: [phone]  [facility] #: --     Date of last appointment:  3/24/25  Date of next appointment:  25  Best number to reach patient:  [phone]

## 2025-07-31 DIAGNOSIS — F41.9 ANXIETY: ICD-10-CM

## 2025-07-31 RX ORDER — VENLAFAXINE HYDROCHLORIDE 37.5 MG/1
37.5 CAPSULE, EXTENDED RELEASE ORAL 3 TIMES DAILY
Qty: 90 CAPSULE | Refills: 1 | Status: SHIPPED | OUTPATIENT
Start: 2025-07-31

## 2025-08-05 DIAGNOSIS — Z12.31 ENCOUNTER FOR SCREENING MAMMOGRAM FOR BREAST CANCER: ICD-10-CM

## 2025-09-22 ENCOUNTER — APPOINTMENT (OUTPATIENT)
Dept: PRIMARY CARE | Facility: CLINIC | Age: 70
End: 2025-09-22
Payer: MEDICARE